# Patient Record
Sex: FEMALE | Race: BLACK OR AFRICAN AMERICAN | NOT HISPANIC OR LATINO | URBAN - METROPOLITAN AREA
[De-identification: names, ages, dates, MRNs, and addresses within clinical notes are randomized per-mention and may not be internally consistent; named-entity substitution may affect disease eponyms.]

---

## 2023-04-02 PROBLEM — R87.612 PAP SMEAR ABNORMALITY OF CERVIX WITH LGSIL: Status: ACTIVE | Noted: 2023-04-02

## 2023-04-02 PROBLEM — H93.19 SUBJECTIVE TINNITUS: Status: ACTIVE | Noted: 2023-04-02

## 2023-04-02 PROBLEM — N93.0 POSTCOITAL BLEEDING: Status: ACTIVE | Noted: 2023-04-02

## 2023-04-02 PROBLEM — H90.3 ASYMMETRICAL SENSORINEURAL HEARING LOSS: Status: ACTIVE | Noted: 2023-04-02

## 2023-04-02 PROBLEM — R10.2 PELVIC PAIN: Status: ACTIVE | Noted: 2023-04-02

## 2023-04-02 PROBLEM — Z97.5 IUD (INTRAUTERINE DEVICE) IN PLACE: Status: ACTIVE | Noted: 2023-04-02
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
## 2023-04-02 PROBLEM — E78.5 HYPERLIPIDEMIA: Status: ACTIVE | Noted: 2023-04-02

## 2023-04-02 PROBLEM — N76.0 BACTERIAL VAGINOSIS: Status: ACTIVE | Noted: 2023-04-02

## 2023-04-02 PROBLEM — B37.9 YEAST INFECTION: Status: ACTIVE | Noted: 2023-04-02

## 2023-04-02 PROBLEM — J01.90 ACUTE SINUSITIS: Status: ACTIVE | Noted: 2023-04-02

## 2023-04-02 PROBLEM — B96.89 BACTERIAL VAGINOSIS: Status: ACTIVE | Noted: 2023-04-02

## 2023-04-02 PROBLEM — R87.619 ABNORMAL PAP SMEAR OF CERVIX: Status: ACTIVE | Noted: 2023-04-02

## 2023-04-02 PROBLEM — S16.1XXA CERVICAL STRAIN: Status: ACTIVE | Noted: 2023-04-02

## 2023-04-02 PROBLEM — H90.41 SENSORINEURAL HEARING LOSS (SNHL) OF RIGHT EAR WITH UNRESTRICTED HEARING OF LEFT EAR: Status: ACTIVE | Noted: 2023-04-02

## 2023-04-02 PROBLEM — F17.200 TOBACCO DEPENDENCE: Status: ACTIVE | Noted: 2023-04-02

## 2023-04-02 PROBLEM — N93.9 ABNORMAL UTERINE BLEEDING (AUB): Status: ACTIVE | Noted: 2023-04-02

## 2023-04-02 PROBLEM — S86.899A SHIN SPLINTS: Status: ACTIVE | Noted: 2023-04-02

## 2023-04-02 PROBLEM — L74.510 HYPERHIDROSIS OF AXILLA: Status: ACTIVE | Noted: 2023-04-02

## 2023-04-02 PROBLEM — R11.0 NAUSEA IN ADULT: Status: ACTIVE | Noted: 2023-04-02

## 2023-04-02 RX ORDER — LEVONORGESTREL 52 MG/1
INTRAUTERINE DEVICE INTRAUTERINE
COMMUNITY

## 2023-04-03 ENCOUNTER — OFFICE VISIT (OUTPATIENT)
Dept: PRIMARY CARE | Facility: CLINIC | Age: 33
End: 2023-04-03
Payer: COMMERCIAL

## 2023-04-03 VITALS
RESPIRATION RATE: 18 BRPM | DIASTOLIC BLOOD PRESSURE: 84 MMHG | SYSTOLIC BLOOD PRESSURE: 110 MMHG | BODY MASS INDEX: 29.88 KG/M2 | HEART RATE: 70 BPM | WEIGHT: 153 LBS

## 2023-04-03 DIAGNOSIS — S33.5XXS LUMBAR SPRAIN, SEQUELA: Primary | ICD-10-CM

## 2023-04-03 DIAGNOSIS — M54.31 SCIATICA OF RIGHT SIDE: ICD-10-CM

## 2023-04-03 PROCEDURE — 99214 OFFICE O/P EST MOD 30 MIN: CPT | Performed by: INTERNAL MEDICINE

## 2023-04-03 RX ORDER — PREDNISONE 10 MG/1
TABLET ORAL
Qty: 22 TABLET | Refills: 0 | Status: SHIPPED | OUTPATIENT
Start: 2023-04-03 | End: 2023-04-10

## 2023-04-03 ASSESSMENT — ENCOUNTER SYMPTOMS
GASTROINTESTINAL NEGATIVE: 1
BACK PAIN: 1
NEUROLOGICAL NEGATIVE: 1
CONSTITUTIONAL NEGATIVE: 1
EYES NEGATIVE: 1
CARDIOVASCULAR NEGATIVE: 1
RESPIRATORY NEGATIVE: 1
PSYCHIATRIC NEGATIVE: 1

## 2023-04-03 NOTE — PROGRESS NOTES
Subjective   Patient ID: Keyona Navas is a 32 y.o. female who presents for Back Pain.    HPI     Patient presents with 2 months of low back pain radiates into her right leg aggravated by work with other lifting or bending forward which she does repetitively at her job.  Went to urgent care was treated with Medrol Dosepak and muscle relaxer with some improvement along with being off of work for a week some improvement.  She is not pregnant or breast-feeding.  No bowel or bladder dysfunction, no falls symptoms conditions moderate severity stable controlled over the past 2 months.    Review of Systems   Constitutional: Negative.    HENT: Negative.     Eyes: Negative.    Respiratory: Negative.     Cardiovascular: Negative.    Gastrointestinal: Negative.    Genitourinary: Negative.    Musculoskeletal:  Positive for back pain.   Skin: Negative.    Neurological: Negative.    Psychiatric/Behavioral: Negative.         Objective   /84   Pulse 70   Resp 18   Wt 69.4 kg (153 lb)   BMI 29.88 kg/m²     Physical Exam  Constitutional:       Appearance: Normal appearance.   HENT:      Head: Normocephalic.      Nose: Nose normal.   Pulmonary:      Effort: Pulmonary effort is normal.   Musculoskeletal:      Cervical back: Normal range of motion.      Right lower leg: No edema.      Left lower leg: No edema.      Comments: Lumbar spasm, stiff gait   Skin:     General: Skin is warm and dry.   Neurological:      General: No focal deficit present.      Mental Status: She is alert and oriented to person, place, and time. Mental status is at baseline.   Psychiatric:         Mood and Affect: Mood normal.         Behavior: Behavior normal.         Thought Content: Thought content normal.         Judgment: Judgment normal.         Assessment/Plan   Diagnoses and all orders for this visit:  Lumbar sprain, sequela  -     Referral to Physical Therapy; Future  -     MR lumbar spine wo IV contrast; Future  -     predniSONE  (Deltasone) 10 mg tablet; Take 4 tablets (40 mg) by mouth once daily for 4 days, THEN 3 tablets (30 mg) once daily for 1 day, THEN 2 tablets (20 mg) once daily for 1 day, THEN 1 tablet (10 mg) once daily for 1 day.  Sciatica of right side  -     Referral to Physical Therapy; Future  -     MR lumbar spine wo IV contrast; Future  -     predniSONE (Deltasone) 10 mg tablet; Take 4 tablets (40 mg) by mouth once daily for 4 days, THEN 3 tablets (30 mg) once daily for 1 day, THEN 2 tablets (20 mg) once daily for 1 day, THEN 1 tablet (10 mg) once daily for 1 day.       Lumbar sprain with right lower extremity sciatica.  We will put her on 40 mg prednisone taper down over a week we will start some physical therapy and will order MRI lumbar spine.  We will put her out of work from 4/3/2023 through 4/13/2023 to allow her back to rest and heal up.  If not improving she will follow-up in the office    This note dictated with Dragon software, not proofread for errors or punctuation.

## 2023-06-05 ENCOUNTER — OFFICE VISIT (OUTPATIENT)
Dept: PRIMARY CARE | Facility: CLINIC | Age: 33
End: 2023-06-05
Payer: COMMERCIAL

## 2023-06-05 VITALS
HEART RATE: 66 BPM | BODY MASS INDEX: 31.02 KG/M2 | HEIGHT: 60 IN | WEIGHT: 158 LBS | TEMPERATURE: 97.5 F | DIASTOLIC BLOOD PRESSURE: 84 MMHG | SYSTOLIC BLOOD PRESSURE: 121 MMHG

## 2023-06-05 DIAGNOSIS — S33.5XXS LUMBAR SPRAIN, SEQUELA: Primary | ICD-10-CM

## 2023-06-05 DIAGNOSIS — F17.200 TOBACCO DEPENDENCE: ICD-10-CM

## 2023-06-05 PROCEDURE — 1036F TOBACCO NON-USER: CPT | Performed by: FAMILY MEDICINE

## 2023-06-05 PROCEDURE — 3008F BODY MASS INDEX DOCD: CPT | Performed by: FAMILY MEDICINE

## 2023-06-05 PROCEDURE — 99213 OFFICE O/P EST LOW 20 MIN: CPT | Performed by: FAMILY MEDICINE

## 2023-06-05 RX ORDER — PREDNISONE 10 MG/1
10 TABLET ORAL DAILY
COMMUNITY
End: 2023-10-05

## 2023-06-05 ASSESSMENT — ENCOUNTER SYMPTOMS
TINGLING: 0
LOSS OF SENSATION: 0

## 2023-06-05 NOTE — PROGRESS NOTES
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    She showed me pictures of beans that she has to bend down and collect magazines wearing from 5 pounds to 7 pounds that is constantly bending and carrying and putting on a shelf she says this job was given to her since November and since November she is having back pain lower back and also now she notices pain going down her legs below the knee joint  On exam her neurological exam was negative and also I see that she has lot of scratch marks on the back    She is 32-year-old I feel this is due to repetitive movements bending forward that she has muscular pain but I will refer her to rehabilitation specialist for work restriction and work strengthening exercise program    I advised patient to use a moisturizer to help with her dry skin on the back    She also is a chronic smoker referred her to smoking cessation class    I advised patient to come back for her annual physical

## 2023-06-05 NOTE — LETTER
June 5, 2023     Patient: Keyona Navas   YOB: 1990   Date of Visit: 6/5/2023       To Whom It May Concern:    Keyona Navas was seen in my clinic on 6/5/2023 at 8:15 am. Please excuse Keyona for her absence from work on this day to make the appointment. She can return back to work on  06/06/2023.    If you have any questions or concerns, please don't hesitate to call.         Sincerely,         Caitlin Andrews MD        CC: No Recipients

## 2023-07-12 ENCOUNTER — OFFICE VISIT (OUTPATIENT)
Dept: PRIMARY CARE | Facility: CLINIC | Age: 33
End: 2023-07-12
Payer: COMMERCIAL

## 2023-07-12 VITALS
WEIGHT: 152 LBS | HEART RATE: 86 BPM | DIASTOLIC BLOOD PRESSURE: 81 MMHG | BODY MASS INDEX: 29.84 KG/M2 | TEMPERATURE: 97.6 F | SYSTOLIC BLOOD PRESSURE: 117 MMHG | HEIGHT: 60 IN

## 2023-07-12 DIAGNOSIS — E55.9 VITAMIN D DEFICIENCY: ICD-10-CM

## 2023-07-12 DIAGNOSIS — Z20.2 STD EXPOSURE: ICD-10-CM

## 2023-07-12 LAB
CALCIDIOL (25 OH VITAMIN D3) (NG/ML) IN SER/PLAS: 38 NG/ML
HEPATITIS B VIRUS SURFACE AG PRESENCE IN SERUM: NONREACTIVE
HEPATITIS C VIRUS AB PRESENCE IN SERUM: NONREACTIVE
HIV 1/ 2 AG/AB SCREEN: NONREACTIVE

## 2023-07-12 PROCEDURE — 3008F BODY MASS INDEX DOCD: CPT | Performed by: FAMILY MEDICINE

## 2023-07-12 PROCEDURE — 86803 HEPATITIS C AB TEST: CPT

## 2023-07-12 PROCEDURE — 1036F TOBACCO NON-USER: CPT | Performed by: FAMILY MEDICINE

## 2023-07-12 PROCEDURE — 86592 SYPHILIS TEST NON-TREP QUAL: CPT

## 2023-07-12 PROCEDURE — 87340 HEPATITIS B SURFACE AG IA: CPT

## 2023-07-12 PROCEDURE — 99395 PREV VISIT EST AGE 18-39: CPT | Performed by: FAMILY MEDICINE

## 2023-07-12 PROCEDURE — 87389 HIV-1 AG W/HIV-1&-2 AB AG IA: CPT

## 2023-07-12 PROCEDURE — 82306 VITAMIN D 25 HYDROXY: CPT

## 2023-07-12 NOTE — PROGRESS NOTES
Subjective   Patient ID: Keyona Navas is a 33 y.o. female who presents for Annual Exam.    HPI     Review of Systems   All other systems reviewed and are negative.      Objective   /81   Pulse 86   Temp 36.4 °C (97.6 °F)   Ht 1.524 m (5')   Wt 68.9 kg (152 lb)   BMI 29.69 kg/m²     Physical Exam  HENT:      Head: Normocephalic.   Cardiovascular:      Rate and Rhythm: Normal rate.   Pulmonary:      Effort: Pulmonary effort is normal.   Abdominal:      General: Abdomen is flat.   Musculoskeletal:         General: Normal range of motion.      Cervical back: Normal range of motion.   Skin:     General: Skin is warm.   Neurological:      General: No focal deficit present.      Mental Status: She is alert.   Psychiatric:         Mood and Affect: Mood normal.         Assessment/Plan     This is a 33-year-old female patient here for her annual well visit    She is still smoking promised to attend the smoking cessation class    Also she has a history of low-grade squamous cell changes on her cervix advised her to see the gynecologist    We talked about healthy sleeping eating and exercise habits and I hope that will help her to heal herself human papilloma virus      Follow-up in 6  months

## 2023-07-13 LAB — RPR MONITORING: NONREACTIVE

## 2023-09-23 PROBLEM — Z86.79 HISTORY OF VARICOSE VEINS: Status: ACTIVE | Noted: 2023-09-23

## 2023-09-23 PROBLEM — N87.0 CIN I (CERVICAL INTRAEPITHELIAL NEOPLASIA I): Status: ACTIVE | Noted: 2023-09-23

## 2023-09-23 RX ORDER — ALBUTEROL SULFATE 90 UG/1
2 AEROSOL, METERED RESPIRATORY (INHALATION) EVERY 4 HOURS PRN
COMMUNITY

## 2023-09-23 RX ORDER — ASPIRIN 325 MG
50000 TABLET, DELAYED RELEASE (ENTERIC COATED) ORAL
COMMUNITY
Start: 2023-04-04

## 2023-09-23 RX ORDER — LORATADINE 10 MG/1
10 TABLET ORAL DAILY PRN
COMMUNITY
Start: 2017-05-12 | End: 2023-10-05 | Stop reason: SDUPTHER

## 2023-09-23 RX ORDER — BUPROPION HYDROCHLORIDE 150 MG/1
150 TABLET, EXTENDED RELEASE ORAL DAILY
COMMUNITY
Start: 2022-12-16 | End: 2023-10-05 | Stop reason: SDUPTHER

## 2023-09-23 RX ORDER — CETIRIZINE HYDROCHLORIDE 10 MG/1
10 TABLET ORAL
COMMUNITY
Start: 2022-08-26

## 2023-10-05 ENCOUNTER — OFFICE VISIT (OUTPATIENT)
Dept: PRIMARY CARE | Facility: CLINIC | Age: 33
End: 2023-10-05
Payer: COMMERCIAL

## 2023-10-05 ENCOUNTER — HOSPITAL ENCOUNTER (OUTPATIENT)
Dept: RADIOLOGY | Facility: HOSPITAL | Age: 33
Discharge: HOME | End: 2023-10-05
Payer: COMMERCIAL

## 2023-10-05 VITALS
HEART RATE: 77 BPM | OXYGEN SATURATION: 99 % | HEIGHT: 60 IN | BODY MASS INDEX: 29.96 KG/M2 | WEIGHT: 152.6 LBS | DIASTOLIC BLOOD PRESSURE: 83 MMHG | SYSTOLIC BLOOD PRESSURE: 128 MMHG

## 2023-10-05 DIAGNOSIS — M54.41 CHRONIC RIGHT-SIDED LOW BACK PAIN WITH RIGHT-SIDED SCIATICA: ICD-10-CM

## 2023-10-05 DIAGNOSIS — G89.29 CHRONIC RIGHT-SIDED LOW BACK PAIN WITH RIGHT-SIDED SCIATICA: ICD-10-CM

## 2023-10-05 DIAGNOSIS — Z71.6 ENCOUNTER FOR SMOKING CESSATION COUNSELING: Primary | ICD-10-CM

## 2023-10-05 PROCEDURE — 72110 X-RAY EXAM L-2 SPINE 4/>VWS: CPT | Performed by: RADIOLOGY

## 2023-10-05 PROCEDURE — 72110 X-RAY EXAM L-2 SPINE 4/>VWS: CPT | Mod: FY

## 2023-10-05 PROCEDURE — 1036F TOBACCO NON-USER: CPT | Performed by: STUDENT IN AN ORGANIZED HEALTH CARE EDUCATION/TRAINING PROGRAM

## 2023-10-05 PROCEDURE — 3008F BODY MASS INDEX DOCD: CPT | Performed by: STUDENT IN AN ORGANIZED HEALTH CARE EDUCATION/TRAINING PROGRAM

## 2023-10-05 PROCEDURE — 99214 OFFICE O/P EST MOD 30 MIN: CPT | Performed by: STUDENT IN AN ORGANIZED HEALTH CARE EDUCATION/TRAINING PROGRAM

## 2023-10-05 RX ORDER — BUPROPION HYDROCHLORIDE 150 MG/1
TABLET, EXTENDED RELEASE ORAL
Qty: 180 TABLET | Refills: 0 | Status: SHIPPED | OUTPATIENT
Start: 2023-10-05

## 2023-10-05 NOTE — PROGRESS NOTES
Subjective   Patient ID: Keyona Navas is a 33 y.o. female who presents for New Patient Visit (Discuss increasing meds and discuss back meds. Pt declined flu shot. ).        HPI    Pt of Dr. Caro , has est with Dr. Andrews since he left   Would like to increase the dose of Wellbutrin to help quit smoking   She is currently vaping , prior to this she was smoking 1/2 PPD since the age of 15   Last use of Wellbutrin in June , none since then        10/23  Physical therapy appt   Back pain since this April , works in a post office , lots of bending and lifting   MRI was ordered , denied by her insurance        Visit Vitals  /83   Pulse 77   Ht 1.524 m (5')   Wt 69.2 kg (152 lb 9.6 oz)   LMP 09/21/2023   SpO2 99%   BMI 29.80 kg/m²   Smoking Status Never   BSA 1.71 m²      Patient's last menstrual period was 09/21/2023.     Review of Systems    Constitutional : No feeling poorly / fevers/ chills / night sweats/ fatigue   Cardiovascular : No CP /Palpitations/ lower extremity edema / syncope   Respiratory : No Cough /LOYA/Dyspnea at rest     CNS: No confusion / HA/ tingling/ numbness/ weakness of extremities  Psychiatric: No anxiety/ depression/ SI/HI    All other systems have been reviewed and are negative for complaint       Physical Exam    Constitutional : Vitals reviewed. Alert and in no distress  Cardiovascular : RRR, Normal S1, S2, No pericardial rub/ gallop, no peripheral edema   Pulmonary: No respiratory distress, CTAB   MSK : Normal gait and station , strength and tone   SLRT negative     Neurologic : CNs 2-12 grossly intact , no obvious FNDs  Psych : A,Ox3, normal mood and affect      Assessment/Plan   Diagnoses and all orders for this visit:  Encounter for smoking cessation counseling  -     buPROPion SR (Wellbutrin SR) 150 mg 12 hr tablet; Start 1 week before quit date , take 1 tablet once a day for three days and then twice daily  Chronic right-sided low back pain with right-sided sciatica  -      XR lumbar spine complete 4+ views; Future  Other orders  -     Follow Up In Primary Care      Letter written for accomodation at work   Imaging for Lower back pain and PT           Conditions addressed and mgmt as noted above.  Pertinent labs, images/ imaging reports , chart review was done .   Age appropriate labs / labs for mgmt of chronic medical conditions ordered, further mgmt pending the results.

## 2023-10-05 NOTE — LETTER
To whom it may concern .   This is written on behalf of Keyona higgins ( 1990) . Please allow for accommodation at work , so that she can sit in a chair and perform her tasks . This will minimize strain on her back from constant bending .     Sherly Elizabeth MD

## 2023-10-05 NOTE — LETTER
October 5, 2023     Patient: Keyona Navas   YOB: 1990   Date of Visit: 10/5/2023       To Whom It May Concern:    Keyona Navas was seen in my clinic on 10/5/2023 at 10:30 am. Please excuse Keyona for her absence from work on this day to make the appointment.    If you have any questions or concerns, please don't hesitate to call.         Sincerely,         Sherly Elizabeth MD        CC: No Recipients

## 2023-10-05 NOTE — PATIENT INSTRUCTIONS
We are on a new system that is paperless.     Lab location for blood work :  1. Located across the office , just past the elevators .   [address]. [address].  If you are coming on a Saturday, go to [address] for the blood draw    Radiology : [address] for Xrays  You can also schedule non urgent imaging by calling .     For scheduling appts, call . You might be receiving call from central scheduling as well.    For scheduling colonoscopy, call .     For scheduling physical therapy, call [phone] ( [phone]).    For any other scheduling questions or locations, please ask the medical assistant or the .

## 2023-10-10 ENCOUNTER — OFFICE VISIT (OUTPATIENT)
Dept: OTOLARYNGOLOGY | Facility: CLINIC | Age: 33
End: 2023-10-10
Payer: COMMERCIAL

## 2023-10-10 VITALS — TEMPERATURE: 97.5 F | HEIGHT: 65 IN | BODY MASS INDEX: 25.39 KG/M2 | WEIGHT: 152.4 LBS

## 2023-10-10 DIAGNOSIS — H90.41 SENSORINEURAL HEARING LOSS (SNHL) OF RIGHT EAR WITH UNRESTRICTED HEARING OF LEFT EAR: ICD-10-CM

## 2023-10-10 DIAGNOSIS — H90.3 ASYMMETRICAL SENSORINEURAL HEARING LOSS: Primary | ICD-10-CM

## 2023-10-10 DIAGNOSIS — H90.3 SENSORINEURAL HEARING LOSS, ASYMMETRICAL: ICD-10-CM

## 2023-10-10 PROCEDURE — 4004F PT TOBACCO SCREEN RCVD TLK: CPT | Performed by: STUDENT IN AN ORGANIZED HEALTH CARE EDUCATION/TRAINING PROGRAM

## 2023-10-10 PROCEDURE — 3008F BODY MASS INDEX DOCD: CPT | Performed by: STUDENT IN AN ORGANIZED HEALTH CARE EDUCATION/TRAINING PROGRAM

## 2023-10-10 PROCEDURE — 99204 OFFICE O/P NEW MOD 45 MIN: CPT | Performed by: STUDENT IN AN ORGANIZED HEALTH CARE EDUCATION/TRAINING PROGRAM

## 2023-10-10 RX ORDER — AMOXICILLIN AND CLAVULANATE POTASSIUM 875; 125 MG/1; MG/1
1 TABLET, FILM COATED ORAL EVERY 12 HOURS
COMMUNITY
End: 2023-12-12 | Stop reason: ALTCHOICE

## 2023-10-10 RX ORDER — METRONIDAZOLE 500 MG/1
1 TABLET ORAL 2 TIMES DAILY
COMMUNITY
End: 2023-12-12 | Stop reason: WASHOUT

## 2023-10-10 RX ORDER — IBUPROFEN 800 MG/1
TABLET ORAL
COMMUNITY
Start: 2023-07-24

## 2023-10-10 ASSESSMENT — ENCOUNTER SYMPTOMS
SHORTNESS OF BREATH: 0
NEUROLOGICAL NEGATIVE: 1
CARDIOVASCULAR NEGATIVE: 1
CONSTITUTIONAL NEGATIVE: 1
GASTROINTESTINAL NEGATIVE: 1
CHILLS: 0
FEVER: 0
RESPIRATORY NEGATIVE: 1

## 2023-10-10 ASSESSMENT — PATIENT HEALTH QUESTIONNAIRE - PHQ9
1. LITTLE INTEREST OR PLEASURE IN DOING THINGS: NOT AT ALL
SUM OF ALL RESPONSES TO PHQ9 QUESTIONS 1 & 2: 0
2. FEELING DOWN, DEPRESSED OR HOPELESS: NOT AT ALL

## 2023-10-11 NOTE — PROGRESS NOTES
CHIEF COMPLAINT:   Chief Complaint   Patient presents with    Hearing Loss     Right ear hearing loss.       HISTORY OF PRESENT ILLNESS: Keyona Navas is a 33 y.o. female who presents today with symptoms of right sided hearing loss.  She reports that she has had right sided hearing loss since birth, but it has progressed in recent years, particularly in word recognition score.  She denies a history of ear surgery or ear tubes.      PAST MEDICAL HISTORY:   Past Medical History:   Diagnosis Date    Acute vaginitis 01/03/2023    Bacterial vaginosis    Candidiasis, unspecified 08/11/2020    Yeast infection    Encounter for gynecological examination (general) (routine) without abnormal findings 09/30/2019    Well female exam with routine gynecological exam    Encounter for insertion of intrauterine contraceptive device     Encounter for IUD insertion    Nausea 08/11/2020    Nausea in adult    Nontraumatic compartment syndrome of left lower extremity 01/05/2016    Compartment syndrome of left lower extremity due to exertion    Nontraumatic compartment syndrome of right lower extremity 01/05/2016    Compartment syndrome of right lower extremity due to exertion    Nontraumatic compartment syndrome of unspecified lower extremity 09/29/2015    Exertional compartment syndrome of lower extremity    Other chest pain 04/28/2017    Atypical chest pain    Other conditions influencing health status     History of dyspareunia    Other conditions influencing health status 12/22/2015    Exertional compartment syndrome    Other problems related to lifestyle     Other problems related to lifestyle    Other specified disorders of bone, lower leg 12/22/2015    Bilateral tibial pain    Other specified health status     No pertinent past medical history    Pain in right knee 04/23/2015    Bilateral knee pain    Pain in right leg 12/22/2015    Leg pain, bilateral    Personal history of other diseases of the digestive system 04/28/2017     History of gastroesophageal reflux (GERD)    Personal history of other diseases of the female genital tract 10/01/2014    History of ovarian cyst    Personal history of other specified conditions 04/08/2015    History of urinary frequency       PAST SURGICAL HISTORY: History reviewed. No pertinent surgical history.    MEDICATIONS:   Current Outpatient Medications:     albuterol 90 mcg/actuation inhaler, Inhale 2 puffs every 4 hours if needed for shortness of breath., Disp: , Rfl:     buPROPion SR (Wellbutrin SR) 150 mg 12 hr tablet, Start 1 week before quit date , take 1 tablet once a day for three days and then twice daily, Disp: 180 tablet, Rfl: 0    cholecalciferol (Vitamin D-3) 1,250 mcg (50,000 unit) capsule, Take 1 capsule (50,000 Units) by mouth every 14 (fourteen) days., Disp: , Rfl:     ibuprofen 800 mg tablet, TAKE 1 TABLET BY MOUTH EVERY 8 HOURS AS NEEDED FOR 10 DAYS, Disp: , Rfl:     levonorgestrel (Mirena) 21 mcg/24 hours (8 yrs) 52 mg IUD, Mirena (52 MG) 20 MCG/24HR IUD  Refills: 0     Active, Disp: , Rfl:     amoxicillin-pot clavulanate (Augmentin) 875-125 mg tablet, Take 1 tablet (875 mg) by mouth every 12 hours., Disp: , Rfl:     cetirizine (ZyrTEC) 10 mg tablet, Take 1 tablet (10 mg) by mouth once daily., Disp: , Rfl:     metroNIDAZOLE (Flagyl) 500 mg tablet, Take 1 tablet (500 mg) by mouth 2 times a day., Disp: , Rfl:     ALLERGIES: No Known Allergies    SOCIAL HISTORY:   reports that she has been smoking cigarettes. She started smoking about 12 years ago. She has been smoking an average of .5 packs per day. She uses smokeless tobacco. She reports current drug use. Drug: Marijuana. She reports that she does not drink alcohol.    FAMILY HISTORY: family history includes Diabetes in an other family member; Hypertension in an other family member; No Known Problems in her mother.    REVIEW OF SYSTEMS  Review of Systems   Constitutional: Negative.  Negative for chills and fever.   HENT:  Positive for  "hearing loss. Negative for ear discharge and ear pain.    Respiratory: Negative.  Negative for shortness of breath.    Cardiovascular: Negative.  Negative for chest pain.   Gastrointestinal: Negative.    Neurological: Negative.        PHYSICAL EXAM:    VITALS:   Vitals:    10/10/23 1530   Temp: 36.4 °C (97.5 °F)   Weight: 69.1 kg (152 lb 6.4 oz)   Height: 1.651 m (5' 5\")        GENERAL: healthy, alert, well developed, well nourished, no distress, cooperative, appears chronologic age    Communicates with normal voice and without hearing aids.    HEAD: atraumatic, normocephalic, no lesions    EYES: normal, PERRLA and EOM's intact    EARS:   Right ear demonstrates a patent external auditory canal with a normal tympanic membrane.    Left ear: demonstrates a patent external auditory canal with a normal tympanic membrane.       NOSE: nose shows no deformity, asymmetry, or inflammation, nasal mucosa normal, septum midline with no perforation or bleeding, turbinates normal, no polyps, no sinus tenderness    ORAL CAVITY/OROPHARYNX: negative findings: lips normal without lesions, buccal mucosa normal, gums healthy, teeth intact, non-carious, palate normal, tongue midline and normal, soft palate, uvula, and tonsils normal    NECK AND SALIVARY GLANDS: Neck is supple without masses or lymphadenopathy. Palpation of the parotid and submandibular gland reveals no masses or tenderness to palpation.    CRANIAL NERVES: intact,   Facial nerve exam  is House - Brackmann 1- Normal Function on the right and 1- Normal Function on the left.    CARDIOVASCULAR: no evidence of peripheral edema    PULMONARY: normal lung excursion, no evidence of retractions    DATA REVIEWED:  Audiogram  I reviewed the audiogram from 6/13/2023, which demonstrated normal left hearing and moderate right rising to normal hearing with word recognition score of 96% on the left and 16% on the right    I reviewed the cochlear implant evaluation and BAHA " evaluation:  Aided Behavioral testing (Viviana bob):   Pure tones: Threshold rise from 65 to 25 dB HL from 250- 6000 Hz with 50 dB HL narrowband masking noise in her left ear.  CNC: 32% tested at 50 dB HL with 40 dB HL speech masking noise in her left ear.  AzBio: 42% tested at 50 dB HL with 40 dB HL speech masking noise in her left ear.    BAHA testing information:   Patient was tested with a [employer] BAHA 5 Power device.     BAHA Behavioral testing:   Pure tones: Thresholds rising from 75 to 35 dB HL with 50 dB HL narrowband masking noise in her left ear.   CNC: 84% tested at 50 dB HL with 40 dB HL speech masking noise in her left ear.  AzBio: 64% tested at 50 dB HL with 40 dB HL speech masking noise in her left ear.    Unaided testing:   Pure tones: See previous audiogram for results.  CNC: 8% tested at 50 dB HL with 40 dB HL speech masking noise in her left ear.  AzBio: 32% tested at 50 dB HL with 40 dB HL speech masking noise in her left ear.     IMPRESSION:   1) Right sensorineural hearing loss    PLAN:  I discussed the patient's sensorineural hearing loss on the right side.  I discussed her options for hearing rehabilitation including, but not limited to, CROS hearing aids, bone-conduction devices including Baha and osia, and cochlear implantation.  In particular, I discussed cochlear implantation at length including that adjusting to the cochlear implantation takes significant amount of time up to 1 year after implantation.  I also discussed the risks of cochlear implantation including, but not limited to, bleeding, pain, infection, poor performance, loss of residual hearing, dizziness, facial weakness, change in taste, cerebrospinal fluid leak, implant infection, implant extrusion, implant malfunction, meningitis, tympanic membrane perforation, need for further procedures.  She is potentially interested in right-sided cochlear implantation, but would like to think more on this.  We will  obtain an MRI IAC with and without contrast.  She will follow-up in 6 to 8 weeks to rediscuss her options.  I would recommend a cochlear brand 632 with 622 backup if she opts for cochlear device.  If she drops for an [employer]ion[employer] device, I would recommend slim J implant with a slim J backup.  If she opts for a MED-EL device, I would recommend a flex 28 with a flex 28 backup.

## 2023-10-23 ENCOUNTER — DOCUMENTATION (OUTPATIENT)
Dept: PHYSICAL MEDICINE AND REHAB | Facility: CLINIC | Age: 33
End: 2023-10-23

## 2023-10-23 NOTE — PROGRESS NOTES
The patient did not show up for her scheduled appointment today. She is free to call back and reschedule at her earliest convenience, at which point she will be reminded of our no show policy.    Evita Hernandez MD  PM&R

## 2023-10-26 ENCOUNTER — ANCILLARY PROCEDURE (OUTPATIENT)
Dept: RADIOLOGY | Facility: CLINIC | Age: 33
End: 2023-10-26
Payer: COMMERCIAL

## 2023-10-26 DIAGNOSIS — H90.3 SENSORINEURAL HEARING LOSS, ASYMMETRICAL: ICD-10-CM

## 2023-10-26 PROCEDURE — A9575 INJ GADOTERATE MEGLUMI 0.1ML: HCPCS | Performed by: STUDENT IN AN ORGANIZED HEALTH CARE EDUCATION/TRAINING PROGRAM

## 2023-10-26 PROCEDURE — 70553 MRI BRAIN STEM W/O & W/DYE: CPT

## 2023-10-26 PROCEDURE — 2550000001 HC RX 255 CONTRASTS: Performed by: STUDENT IN AN ORGANIZED HEALTH CARE EDUCATION/TRAINING PROGRAM

## 2023-10-26 PROCEDURE — 70553 MRI BRAIN STEM W/O & W/DYE: CPT | Performed by: RADIOLOGY

## 2023-10-26 RX ORDER — GADOTERATE MEGLUMINE 376.9 MG/ML
14 INJECTION INTRAVENOUS
Status: COMPLETED | OUTPATIENT
Start: 2023-10-26 | End: 2023-10-26

## 2023-10-26 RX ADMIN — GADOTERATE MEGLUMINE 14 ML: 376.9 INJECTION INTRAVENOUS at 10:09

## 2023-10-27 ENCOUNTER — TELEPHONE (OUTPATIENT)
Dept: OPERATING ROOM | Facility: HOSPITAL | Age: 33
End: 2023-10-27
Payer: COMMERCIAL

## 2023-10-27 ENCOUNTER — TELEPHONE (OUTPATIENT)
Dept: OTOLARYNGOLOGY | Facility: CLINIC | Age: 33
End: 2023-10-27
Payer: COMMERCIAL

## 2023-10-27 NOTE — TELEPHONE ENCOUNTER
"Patient notified of MRI results reviewed by Dr. Grayson. Message was left on her voicemail.    \"I reviewed both the images and report.  Can you call her and let her know the MRI was normal.\"  Dr. Grayson      "

## 2023-10-27 NOTE — TELEPHONE ENCOUNTER
Patient called on preferred number about MRI results. Per Dr. Grayson the MRI results were normal. Left voicemail with results.

## 2023-12-12 ENCOUNTER — OFFICE VISIT (OUTPATIENT)
Dept: OTOLARYNGOLOGY | Facility: CLINIC | Age: 33
End: 2023-12-12
Payer: COMMERCIAL

## 2023-12-12 VITALS — WEIGHT: 147.34 LBS | TEMPERATURE: 97.8 F | BODY MASS INDEX: 24.55 KG/M2 | HEIGHT: 65 IN

## 2023-12-12 DIAGNOSIS — H90.3 SENSORINEURAL HEARING LOSS (SNHL) OF BOTH EARS: ICD-10-CM

## 2023-12-12 DIAGNOSIS — Z01.818 PREOPERATIVE CLEARANCE: ICD-10-CM

## 2023-12-12 PROCEDURE — 99213 OFFICE O/P EST LOW 20 MIN: CPT | Performed by: STUDENT IN AN ORGANIZED HEALTH CARE EDUCATION/TRAINING PROGRAM

## 2023-12-12 PROCEDURE — 4004F PT TOBACCO SCREEN RCVD TLK: CPT | Performed by: STUDENT IN AN ORGANIZED HEALTH CARE EDUCATION/TRAINING PROGRAM

## 2023-12-12 PROCEDURE — 3008F BODY MASS INDEX DOCD: CPT | Performed by: STUDENT IN AN ORGANIZED HEALTH CARE EDUCATION/TRAINING PROGRAM

## 2023-12-12 RX ORDER — OMEPRAZOLE 20 MG/1
20 TABLET, DELAYED RELEASE ORAL
Qty: 15 TABLET | Refills: 0 | Status: SHIPPED | OUTPATIENT
Start: 2023-12-12 | End: 2023-12-27

## 2023-12-12 RX ORDER — PREDNISONE 10 MG/1
TABLET ORAL
Qty: 60 TABLET | Refills: 0 | Status: SHIPPED | OUTPATIENT
Start: 2023-12-12 | End: 2023-12-27

## 2023-12-12 ASSESSMENT — ENCOUNTER SYMPTOMS
RESPIRATORY NEGATIVE: 1
NEUROLOGICAL NEGATIVE: 1
CARDIOVASCULAR NEGATIVE: 1
SHORTNESS OF BREATH: 0
FEVER: 0
CHILLS: 0
CONSTITUTIONAL NEGATIVE: 1
GASTROINTESTINAL NEGATIVE: 1

## 2023-12-12 NOTE — LETTER
December 12, 2023     Patient: Keyona Navas   YOB: 1990   Date of Visit: 12/12/2023       To Whom It May Concern:    Keyona Navas was seen in my clinic on 12/12/2023 at 8:00 am. Please excuse Keyona for her absence from school on this day to make the appointment.    If you have any questions or concerns, please don't hesitate to call.         Sincerely,         Ed Grayson MD        CC:   No Recipients

## 2023-12-12 NOTE — PROGRESS NOTES
CHIEF COMPLAINT:   Chief Complaint   Patient presents with    Follow-up     6 wks follow  up to discuss expectation from  surgery.       HISTORY OF PRESENT ILLNESS from 10/10/2023: Keoyna Navas is a 33 y.o. female who presents today with symptoms of right sided hearing loss.  She reports that she has had right sided hearing loss since birth, but it has progressed in recent years, particularly in word recognition score.  She denies a history of ear surgery or ear tubes.      Interval history: She has no change in her symptoms.  She continues to have significant right-sided hearing loss.  She has thought about her hearing options, and is most interested in cochlear implantation.    PAST MEDICAL HISTORY:   Past Medical History:   Diagnosis Date    Acute vaginitis 01/03/2023    Bacterial vaginosis    Candidiasis, unspecified 08/11/2020    Yeast infection    Encounter for gynecological examination (general) (routine) without abnormal findings 09/30/2019    Well female exam with routine gynecological exam    Encounter for insertion of intrauterine contraceptive device     Encounter for IUD insertion    Nausea 08/11/2020    Nausea in adult    Nontraumatic compartment syndrome of left lower extremity 01/05/2016    Compartment syndrome of left lower extremity due to exertion    Nontraumatic compartment syndrome of right lower extremity 01/05/2016    Compartment syndrome of right lower extremity due to exertion    Nontraumatic compartment syndrome of unspecified lower extremity 09/29/2015    Exertional compartment syndrome of lower extremity    Other chest pain 04/28/2017    Atypical chest pain    Other conditions influencing health status     History of dyspareunia    Other conditions influencing health status 12/22/2015    Exertional compartment syndrome    Other problems related to lifestyle     Other problems related to lifestyle    Other specified disorders of bone, lower leg 12/22/2015    Bilateral tibial pain     Other specified health status     No pertinent past medical history    Pain in right knee 04/23/2015    Bilateral knee pain    Pain in right leg 12/22/2015    Leg pain, bilateral    Personal history of other diseases of the digestive system 04/28/2017    History of gastroesophageal reflux (GERD)    Personal history of other diseases of the female genital tract 10/01/2014    History of ovarian cyst    Personal history of other specified conditions 04/08/2015    History of urinary frequency       PAST SURGICAL HISTORY: History reviewed. No pertinent surgical history.    MEDICATIONS:   Current Outpatient Medications:     albuterol 90 mcg/actuation inhaler, Inhale 2 puffs every 4 hours if needed for shortness of breath., Disp: , Rfl:     buPROPion SR (Wellbutrin SR) 150 mg 12 hr tablet, Start 1 week before quit date , take 1 tablet once a day for three days and then twice daily, Disp: 180 tablet, Rfl: 0    cetirizine (ZyrTEC) 10 mg tablet, Take 1 tablet (10 mg) by mouth once daily., Disp: , Rfl:     cholecalciferol (Vitamin D-3) 1,250 mcg (50,000 unit) capsule, Take 1 capsule (50,000 Units) by mouth every 14 (fourteen) days., Disp: , Rfl:     ibuprofen 800 mg tablet, TAKE 1 TABLET BY MOUTH EVERY 8 HOURS AS NEEDED FOR 10 DAYS, Disp: , Rfl:     levonorgestrel (Mirena) 21 mcg/24 hours (8 yrs) 52 mg IUD, Mirena (52 MG) 20 MCG/24HR IUD  Refills: 0     Active, Disp: , Rfl:     omeprazole OTC (PriLOSEC OTC) 20 mg EC tablet, Take 1 tablet (20 mg) by mouth once daily in the morning. Take before meals for 15 days. Do not crush, chew, or split. TAKE WHILE ON STEROIDS FOR SURGERY, Disp: 15 tablet, Rfl: 0    predniSONE (Deltasone) 10 mg tablet, Take 6 tablets (60 mg) by mouth once daily for 3 days, THEN No dosage for 1 day, THEN 6 tablets (60 mg) once daily for 2 days, THEN 5 tablets (50 mg) once daily for 2 days, THEN 4 tablets (40 mg) once daily for 2 days, THEN 3 tablets (30 mg) once daily for 2 days, THEN 2 tablets (20 mg)  "once daily for 2 days, THEN 1 tablet (10 mg) once daily for 2 days. START 3 days BEFORE surgery, NOTHING the day OF surgery, and RESTART the day AFTER surgery.., Disp: 60 tablet, Rfl: 0    ALLERGIES: No Known Allergies    SOCIAL HISTORY:   reports that she quit smoking 5 days ago. Her smoking use included cigarettes. She started smoking about 12 years ago. She smoked an average of .5 packs per day. She uses smokeless tobacco. She reports current drug use. Drug: Marijuana. She reports that she does not drink alcohol.    FAMILY HISTORY: family history includes Diabetes in an other family member; Hypertension in an other family member; No Known Problems in her mother.    REVIEW OF SYSTEMS  Review of Systems   Constitutional: Negative.  Negative for chills and fever.   HENT:  Positive for hearing loss. Negative for ear discharge and ear pain.    Respiratory: Negative.  Negative for shortness of breath.    Cardiovascular: Negative.  Negative for chest pain.   Gastrointestinal: Negative.    Neurological: Negative.        PHYSICAL EXAM:    VITALS:   Vitals:    12/12/23 0759   Temp: 36.6 °C (97.8 °F)   Weight: 66.8 kg (147 lb 5.4 oz)   Height: 1.651 m (5' 5\")        GENERAL: healthy, alert, well developed, well nourished, no distress, cooperative, appears chronologic age    Communicates with normal voice and without hearing aids.    HEAD: atraumatic, normocephalic, no lesions    EYES: normal, PERRLA and EOM's intact    EARS:   Right ear demonstrates a patent external auditory canal with a normal tympanic membrane.    Left ear: demonstrates a patent external auditory canal with a normal tympanic membrane.       NOSE: nose shows no deformity, asymmetry, or inflammation, nasal mucosa normal,     ORAL CAVITY/OROPHARYNX: negative findings: lips normal without lesions, buccal mucosa normal, gums healthy, teeth intact, non-carious, palate normal, tongue midline and normal, soft palate, uvula, and tonsils normal    NECK AND " SALIVARY GLANDS: Neck is supple without masses or lymphadenopathy. Palpation of the parotid and submandibular gland reveals no masses or tenderness to palpation.    CRANIAL NERVES: intact,   Facial nerve exam  is House - Brackmann 1- Normal Function on the right and 1- Normal Function on the left.    CARDIOVASCULAR: no evidence of peripheral edema    PULMONARY: normal lung excursion, no evidence of retractions    DATA REVIEWED:  Audiogram  I reviewed the audiogram from 6/13/2023, which demonstrated normal left hearing and moderate right rising to normal hearing with word recognition score of 96% on the left and 16% on the right    I reviewed the cochlear implant evaluation and BAHA evaluation:  Aided Behavioral testing (Viviana bob):   Pure tones: Threshold rise from 65 to 25 dB HL from 250- 6000 Hz with 50 dB HL narrowband masking noise in her left ear.  CNC: 32% tested at 50 dB HL with 40 dB HL speech masking noise in her left ear.  AzBio: 42% tested at 50 dB HL with 40 dB HL speech masking noise in her left ear.    BAHA testing information:   Patient was tested with a [employer] BAHA 5 Power device.     BAHA Behavioral testing:   Pure tones: Thresholds rising from 75 to 35 dB HL with 50 dB HL narrowband masking noise in her left ear.   CNC: 84% tested at 50 dB HL with 40 dB HL speech masking noise in her left ear.  AzBio: 64% tested at 50 dB HL with 40 dB HL speech masking noise in her left ear.    Unaided testing:   Pure tones: See previous audiogram for results.  CNC: 8% tested at 50 dB HL with 40 dB HL speech masking noise in her left ear.  AzBio: 32% tested at 50 dB HL with 40 dB HL speech masking noise in her left ear.     I reviewed the MRI IAC from 10/26/2023, both the images and the report.  There was no evidence of retrocochlear pathology.    IMPRESSION:   1) Right sensorineural hearing loss    PLAN:  I discussed the patient's sensorineural hearing loss on the right side.  I discussed  her options for hearing rehabilitation including, but not limited to, CROS hearing aids, bone-conduction devices including Baha and osia, and cochlear implantation.  In particular, I discussed cochlear implantation at length including that adjusting to the cochlear implantation takes significant amount of time up to 1 year after implantation.  I also discussed the risks of cochlear implantation including, but not limited to, bleeding, pain, infection, poor performance, loss of residual hearing, dizziness, facial weakness, change in taste, cerebrospinal fluid leak, implant infection, implant extrusion, implant malfunction, meningitis, tympanic membrane perforation, need for further procedures.  She is interested in right-sided cochlear implantation.  I would recommend a cochlear brand 632 with 622 backup if she opts for cochlear device.  If she drops for an Advanced [employer]ionics device, I would recommend slim J implant with a slim J backup.  If she opts for a MED-EL device, I would recommend a flex 28 with a flex 28 backup.  I also would recommend a prednisone taper as well as omeprazole to start 3 days before surgery.  I discussed the risks of steroids including, but not limited to, increased appetite, insomnia, mood changes, osteoporosis, stomach ulcers, avascular necrosis of the hip, reduction in her bone steroid production.  We will plan for right-sided cochlear implantation.  She will need to undergo [id number] vaccination.  I would also like her to have ECOG monitoring during cochlear implantation.    Ed Grayson MD

## 2023-12-12 NOTE — LETTER
December 12, 2023     Patient: Keyona Navas   YOB: 1990   Date of Visit: 12/12/2023       To Whom It May Concern:    Keyona Navas was seen in my clinic on 12/12/2023 at 8:00 am. Please excuse Keyona for her absence from school/work on this day to make the appointment.    If you have any questions or concerns, please don't hesitate to call [phone].         Sincerely,         Ed Grayson MD        CC: No Recipients

## 2023-12-22 NOTE — PROGRESS NOTES
Chief Complaint: Low back pain    Dear Dr. Andrews     RHD or LHD    I had the pleasure of seeing your patient, Keyona Navas, in clinic today. As you know,  she is a pleasant 32yo Female with past medical history of tobacco dependence, GERD, HLD, who presents for consultation to evaluate low back pain with radicular symptoms.     TIMELINE OF COMPLAINT(S):     Pain started on 4/19/2023.     ED Note 06/22/2023 - LBP, recently diagnosed with lumbar radiculopathy. States she ran out of her Flexeril and low back pain increased. Denied any red flag s/s, no neurologic deficit. No new imaging. Patient received IM Toradol and lidocaine patch and was discharged home.     Pain:  LOCATION-  RADIATION-  ASSOCIATED WITH-  NUMBNESS/TINGLING-  WEAKNESS-  CONSTANT or INTERMITTENT-  SEVERITY/QUANTITY-  QUALITY-  EXACERBATED BY-  BETTER WITH-  TRIED-      Anti-Inflammatories: Ibuprofen, previously prednisone, Toradol, meloxicam, naproxen      Muscle relaxants: cyclobenzaprine      Anti-depressants:      Neuroleptics:      LDN:    PHYSICAL THERAPY: Spring of 2023  TENS unit:  CHIROPRACTIC MANIPULATION:  ACUPUNCTURE TREATMENTS:  DEEP TISSUE MASSAGE THERAPY:  OSTEOPATHIC MANIPULATION THERAPY:  INJECTIONS:  EMG/NCS:    IMAGING:    === 10/05/23 ===    XR LUMBAR SPINE COMPLETE 4+ VIEWS    - Impression -  Normal radiographs of the lumbar spine    Lab Results   Component Value Date    HGBA1C 5.4 01/03/2023       FUNCTIONAL HISTORY: The patient is independent in all ADLs, mobility, and driving. The patient does not use any assistive device.    SH:  Lives in:  Lives with:  Occupation:  Tobacco:  Alcohol:  Drugs:    ROS: The patient denies any bowel or bladder incontinence/accidents, night sweats, fevers, chills, recent significant weight loss. A 14 point review of systems was reviewed with the patient and is as above and otherwise negative.  ROS questionnaire positive for     PHYSICAL EXAM    GEN - Alert, well-developed,  well-nourished, no acute distress  PSYCH - Cooperative, appropriate mood and affect  HEENT - NC/AT  RESP - Non-labored respirations, equal expansion  CV - warm and well-perfused, No cyanosis or edema in extremities. DP pulses 2+ bilaterally  ABD- soft, ND  SKIN - No rash.    NECK/EXTR- Cervical ROM is full with forward flexion, extension, rotation, and side bending with no pain. Spurlings and Lhermitte's negative. No tenderness of the cervical spinous processes. No tenderness of the cervical paraspinal muscles and trapezei musculature as well as the scapular musculature. Scapulae are symmetrical without evidence of medial or lateral winging.    Shoulder ROM full with flexion, abduction, external and internal rotation. No tenderness of SC, AC, or bicipital groove. Negative Empty can test. Negative Neer's test. Negative Hawkin's test. Negative Somervell. Negative Speed's test. Supraspinatus strength 5/5 bilaterally. Infraspinatus strength 5/5 bilaterally. Belly press negative bilaterally. Lift-off negative bilaterally. Negative apprehension.    BACK/SPINE - Symmetric posture, no erythema, edema, or swelling.  Mild left sided back pain with lumbar flexion. No pain with extension, rotation, or side bend. No pain with facet loading. No tenderness of lumbosacral spinous processes. Mild tenderness over the left lumbar paraspinal muscles. No tenderness over the iliolumbar ligaments bilaterally. No TTP of bilateral PSIS, sacral sulcus, gluteus medius, piriformis, greater trochanters, or IT band. Sacral compression negative bilaterally. Straight leg raise negative bilaterally. Sitting slump test negative bilaterally. Femoral stretch test negative bilaterally.     HIPS/PELVIS - Symmetric in standing and lying Passive hip flexion, internal rotation, and external rotation within functional normal limits bilaterally without provocation of pain symptoms. No tenderness over iliopsoas tendon.Negative FABERs bilaterally. Negative hip  clicking. hip Negative hip impingement test. Negative log roll. Negative resisted active SLR. No pain with deep hip flexion.Negative single leg hop test. Daria test negative.     Knee: No warmth, effusion, or erythema.  No popliteal fullness.  No anterior, posterior, medial or lateral instability.  No pes anserine tenderness.  There is no pain with hyperflexion of the knee.  There is no pain with varus or valgus stressing of the knee during flexion and extension.  There is no crepitus.  There is no medial or lateral joint line tenderness.    NEURO - UE strength 5/5 -  including shoulder abduction, biceps, triceps, wrist extensors, finger flexors, interossei, and    LE strength 5/5 -  including hip flexors, knee flexors, knee extensors, ankle dorsiflexors, ankle plantar flexors, and EHL   Sensation - intact to light touch in bilateral lower extremities.   Reflexes - 2+ biceps, brachioradialis, triceps, patellar and Achilles reflexes bilaterally No Clonus, No Babinski, Honeycutt's negative bilaterally  GAIT - Normal base, normal stride length, non-antalgic. Able to toe walk and heel walk without difficulty. Able to perform tandem gait without difficulty.    IMPRESSION:    This is a pleasant 32yo Female with past medical history of tobacco dependence, GERD, HLD, who presents for consultation to evaluate low back pain with radicular symptoms.        -Patient Education: Extensive time was spent educating the patient on relevant anatomy, clinical findings and imaging, as well as discussing the potential diagnoses as discussed above.           The patient expressed understanding and agreement with the assessment and plan. Patient encouraged to contact us should they have any questions, concerns, or any changes in symptoms.     Thank you for allowing me to participate in the care of your patient.      ** Dictated with voice recognition software, please forgive any errors in grammar and/or spelling **      A copy of this  consultation report was sent electronically to the referring provider,  Dr. Andrews

## 2023-12-26 ENCOUNTER — APPOINTMENT (OUTPATIENT)
Dept: PHYSICAL MEDICINE AND REHAB | Facility: CLINIC | Age: 33
End: 2023-12-26
Payer: COMMERCIAL

## 2023-12-26 NOTE — LETTER
December 22, 2023     Caitlin Andrews MD  [id number] [address] [address]    Patient: Keyona Navas   YOB: 1990   Date of Visit: 12/26/2023       Dear Dr. Caitlin Andrews MD:    Thank you for referring Keyona Navas to me for evaluation. Below are my notes for this consultation.  If you have questions, please do not hesitate to call me. I look forward to following your patient along with you.       Sincerely,     Evita Hernandez MD      CC: No Recipients  ______________________________________________________________________________________

## 2024-01-03 ENCOUNTER — TELEPHONE (OUTPATIENT)
Dept: PRIMARY CARE | Facility: CLINIC | Age: 34
End: 2024-01-03
Payer: COMMERCIAL

## 2024-01-03 NOTE — TELEPHONE ENCOUNTER
Pt says that she wants Dr Andrews to be her primary care doctor only went back to her old pcp because she wanted refills for her medication and Dr Andrews wouldn't fill them until her appointment on 1/17. So she says she is going to keep scheduling appointments with her because she wants her to be her pcp.

## 2024-01-09 ENCOUNTER — CLINICAL SUPPORT (OUTPATIENT)
Dept: AUDIOLOGY | Facility: CLINIC | Age: 34
End: 2024-01-09
Payer: COMMERCIAL

## 2024-01-09 DIAGNOSIS — H90.41 SENSORINEURAL HEARING LOSS (SNHL) OF RIGHT EAR WITH UNRESTRICTED HEARING OF LEFT EAR: Primary | ICD-10-CM

## 2024-01-09 PROCEDURE — 92626 EVAL AUD FUNCJ 1ST HOUR: CPT | Performed by: AUDIOLOGIST

## 2024-01-09 NOTE — LETTER
January 9, 2024     Patient: Keyona Navas   YOB: 1990   Date of Visit: 1/9/2024       To Whom It May Concern:    Keyona Navas was seen in my clinic on 1/9/2024 at 9:00 am. Please excuse Keyona for her absence from work on this day to make the appointment.    If you have any questions or concerns, please don't hesitate to call.         Sincerely,         Daxa Sauer, MANJEET, CCC-A        CC: No Recipients

## 2024-01-09 NOTE — PROGRESS NOTES
Cochlear implant device selection and counseling    HISTORY:  Patient was seen by the Audiology Department for Cochlear Implant device selection and counseling.   She was seen for an audiologic evaluation in June at [facility] and [facility] where she was then referred here for a cochlear implant/ osseointegrated device evaluation which took place on 9/25/2023. She has case history significant for sensorineural hearing loss on her right side with unrestricted hearing on her left. She reported that she wore a hearing aid on her right side in childhood through the age of [age]. She stated that she has recently started a job at the [employer] and has noticed more of a struggle with her hearing loss. She reported that she is ready to pursue hearing assistive technology and explore her options therein. Upon ENT consultation, Ms. Navas plans to move forward with a right cochlear implant.       RESULTS:  The Cochlear Implant Quality of Life-35 (CIQOL-35) Profile was administered today. Patients responses gave the following raw scores and converted scores:  Communication = 20 raw, 32.28 converted  Emotional = 18 raw, 62.86 converted  Entertainment = 23 raw, 79.42 converted  Environment = 16 raw, 48.85 converted  Listening Effort = 7 raw, 15.88 converted  Social = 16 raw, 53.03 converted  CIQOL-10 Global 29 raw, 44.98 converted      Ms. Navas has a well-documented moderate sensorineural hearing loss in the right ear. Due to the lack of benefit from appropriate amplification, she has expressed interest in a cochlear implant.  Testing shows poor sentence and word understanding, even with an appropriately programmed hearing aid indicating a severe to profound functional impairment.  The audiologic findings demonstrate that Ms. Navas has partial residual hearing for tonal stimuli and speech detection with a hearing aid, demonstrating that the auditory cranial nerve can be stimulated. However, a hearing aid is not strong  enough to provide benefit for speech understanding due to the severity of the hearing loss in the right ear. On this basis, patient is judged to be an audiologic candidate for a cochlear implant.  Patient/family have been counseled regarding the post-operative cochlear implant protocol and are properly motivated and able to participate in the post cochlear implant rehabilitation program. Reasonable Expectations were discussed and completed with the patient/family. They have been given written and recorded information regarding cochlear implant candidacy and protocol. Physical device examples were demonstrated today. Ms. Navas is interested in the Cochlear Be my eyess system Kanso 2 system in Black.  Final device selection was completed today, order form will be forwarded.    Patient has been previously counseled on the treatment options for single sided deafness (SSD) including a CROS aid system, a BAHA implant or a cochlear implant. She is interested in pursuing a cochlear implant for SSD. She wishes to achieve a more balanced sound and attempt to restore hearing in her right ear to achieve binaural hearing again.           TREATMENT PLAN:  1. Follow-up with Dr. Grayson regarding test results.  2. From an audiologic standpoint, patient is a candidate for a cochlear implant.  3. Further recommendations following surgical consult.    [phone]    Genaro Zuñiga, CCC-A

## 2024-01-09 NOTE — LETTER
January 9, 2024     Dulce Maria España, PhD, CCC-A  [id number]  Audiology Services  [facility] [address]    Patient: Keyona Navas   YOB: 1990   Date of Visit: 1/9/2024       Dear Dr. Dulce Maria España, PhD, CCC-A:    Thank you for referring Keyona Navas to me for evaluation. Below are my notes for this consultation.  If you have questions, please do not hesitate to call me. I look forward to following your patient along with you.       Sincerely,     Daxa Sauer, MANJEET, CCC-A      CC: No Recipients  ______________________________________________________________________________________    Cochlear implant device selection and counseling    HISTORY:  Patient was seen by the Audiology Department for Cochlear Implant device selection and counseling.   She was seen for an audiologic evaluation in June at [facility] where she was then referred here for a cochlear implant/ osseointegrated device evaluation which took place on 9/25/2023. She has case history significant for sensorineural hearing loss on her right side with unrestricted hearing on her left. She reported that she wore a hearing aid on her right side in childhood through the age of [age]. She stated that she has recently started a job at the [employer] office and has noticed more of a struggle with her hearing loss. She reported that she is ready to pursue hearing assistive technology and explore her options therein. Upon ENT consultation, Ms. Navas plans to move forward with a right cochlear implant.       RESULTS:  The Cochlear Implant Quality of Life-35 (CIQOL-35) Profile was administered today. Patients responses gave the following raw scores and converted scores:  Communication = 20 raw, 32.28 converted  Emotional = 18 raw, 62.86 converted  Entertainment = 23 raw, 79.42 converted  Environment = 16 raw, 48.85 converted  Listening Effort = 7 raw, 15.88 converted  Social = 16 raw, 53.03 converted  CIQOL-10  Global 29 raw, 44.98 converted      Ms. Navsa has a well-documented moderate sensorineural hearing loss in the right ear. Due to the lack of benefit from appropriate amplification, she has expressed interest in a cochlear implant.  Testing shows poor sentence and word understanding, even with an appropriately programmed hearing aid indicating a severe to profound functional impairment.  The audiologic findings demonstrate that Ms. Navas has partial residual hearing for tonal stimuli and speech detection with a hearing aid, demonstrating that the auditory cranial nerve can be stimulated. However, a hearing aid is not strong enough to provide benefit for speech understanding due to the severity of the hearing loss in the right ear. On this basis, patient is judged to be an audiologic candidate for a cochlear implant.  Patient/family have been counseled regarding the post-operative cochlear implant protocol and are properly motivated and able to participate in the post cochlear implant rehabilitation program. Reasonable Expectations were discussed and completed with the patient/family. They have been given written and recorded information regarding cochlear implant candidacy and protocol. Physical device examples were demonstrated today. Ms. Navas is interested in the Cochlear GoodyTag system Kanso 2 system in Black.  Final device selection was completed today, order form will be forwarded.    Patient has been previously counseled on the treatment options for single sided deafness (SSD) including a CROS aid system, a BAHA implant or a cochlear implant. She is interested in pursuing a cochlear implant for SSD. She wishes to achieve a more balanced sound and attempt to restore hearing in her right ear to achieve binaural hearing again.           TREATMENT PLAN:  1. Follow-up with Dr. Grayson regarding test results.  2. From an audiologic standpoint, patient is a candidate for a cochlear implant.  3. Further  recommendations following surgical consult.    [id number]    Genaro Zuñiga, CCC-A

## 2024-01-17 ENCOUNTER — APPOINTMENT (OUTPATIENT)
Dept: PRIMARY CARE | Facility: CLINIC | Age: 34
End: 2024-01-17
Payer: COMMERCIAL

## 2024-01-22 ENCOUNTER — TELEPHONE (OUTPATIENT)
Dept: OTOLARYNGOLOGY | Facility: CLINIC | Age: 34
End: 2024-01-22
Payer: COMMERCIAL

## 2024-01-22 NOTE — TELEPHONE ENCOUNTER
Contacted the patient at [phone] to confirm a date for her cochlear implant surgery with Dr. Grayson. Voice message left with office contact information and will await a response.

## 2024-01-23 ENCOUNTER — HOSPITAL ENCOUNTER (OUTPATIENT)
Facility: CLINIC | Age: 34
Setting detail: OUTPATIENT SURGERY
End: 2024-01-23
Attending: STUDENT IN AN ORGANIZED HEALTH CARE EDUCATION/TRAINING PROGRAM | Admitting: STUDENT IN AN ORGANIZED HEALTH CARE EDUCATION/TRAINING PROGRAM
Payer: COMMERCIAL

## 2024-01-23 DIAGNOSIS — H90.3 SNHL (SENSORY-NEURAL HEARING LOSS), ASYMMETRICAL: ICD-10-CM

## 2024-01-29 ENCOUNTER — TELEPHONE (OUTPATIENT)
Dept: OTOLARYNGOLOGY | Facility: CLINIC | Age: 34
End: 2024-01-29
Payer: COMMERCIAL

## 2024-01-29 DIAGNOSIS — Z01.818 PRE-OP TESTING: ICD-10-CM

## 2024-01-29 NOTE — TELEPHONE ENCOUNTER
Contacted the patient at [phone] regarding her upcoming procedure and labs needed prior to 2/7. Left voice message regarding the labs and where to have them drawn prior to surgery.

## 2024-01-31 ENCOUNTER — OFFICE VISIT (OUTPATIENT)
Dept: PHYSICAL MEDICINE AND REHAB | Facility: CLINIC | Age: 34
End: 2024-01-31
Payer: COMMERCIAL

## 2024-01-31 VITALS
TEMPERATURE: 97.7 F | BODY MASS INDEX: 23.99 KG/M2 | OXYGEN SATURATION: 99 % | SYSTOLIC BLOOD PRESSURE: 130 MMHG | DIASTOLIC BLOOD PRESSURE: 77 MMHG | WEIGHT: 144 LBS | HEIGHT: 65 IN | HEART RATE: 98 BPM

## 2024-01-31 DIAGNOSIS — M54.16 LUMBAR RADICULOPATHY: ICD-10-CM

## 2024-01-31 DIAGNOSIS — M47.817 LUMBOSACRAL SPONDYLOSIS WITHOUT MYELOPATHY: Primary | ICD-10-CM

## 2024-01-31 DIAGNOSIS — M79.18 MYOFASCIAL PAIN: ICD-10-CM

## 2024-01-31 PROCEDURE — 99214 OFFICE O/P EST MOD 30 MIN: CPT | Performed by: PHYSICAL MEDICINE & REHABILITATION

## 2024-01-31 PROCEDURE — 3008F BODY MASS INDEX DOCD: CPT | Performed by: PHYSICAL MEDICINE & REHABILITATION

## 2024-01-31 RX ORDER — METHOCARBAMOL 500 MG/1
500-1000 TABLET, FILM COATED ORAL 4 TIMES DAILY PRN
Qty: 240 TABLET | Refills: 1 | Status: SHIPPED | OUTPATIENT
Start: 2024-01-31 | End: 2024-03-31

## 2024-01-31 ASSESSMENT — PAIN SCALES - GENERAL: PAINLEVEL: 2

## 2024-01-31 NOTE — PATIENT INSTRUCTIONS
-Licart patch sample provided, let me know if it helps  -Start Robaxin up to 4 times per day  -Consider other medications in the future  -Core exercises provided, start doing them daily  -Lumbar MRI ordered  -Consider injections: Trigger point injections (handout provided), referral for spine injections  -Functional capacity evaluation ordered  -I will fill out the work duty restrictions form after I get the functional capacity evaluation  -Do not wear your brace for more than 2 to 3 hours/day  -Follow-up after MRI
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
## 2024-02-01 ENCOUNTER — TELEPHONE (OUTPATIENT)
Dept: OTOLARYNGOLOGY | Facility: CLINIC | Age: 34
End: 2024-02-01
Payer: COMMERCIAL

## 2024-02-01 NOTE — TELEPHONE ENCOUNTER
Attempted to reach patient at [phone] but was unsuccessful. I was able to reach the patient's mother, Blanca Navas (+1 alternate) at [phone]. She says that her daughter likely doesn't answer because she is working. I told her that PAT was trying to reach her regarding her upcoming surgery on 2/7 but have been unsuccessful. She said that she would text her daughter the message including the contact number for [facility] ([phone]) and my contact number ([phone]).

## 2024-02-06 ENCOUNTER — ANESTHESIA EVENT (OUTPATIENT)
Dept: OPERATING ROOM | Facility: CLINIC | Age: 34
End: 2024-02-06

## 2024-02-06 RX ORDER — SODIUM CHLORIDE, SODIUM LACTATE, POTASSIUM CHLORIDE, CALCIUM CHLORIDE 600; 310; 30; 20 MG/100ML; MG/100ML; MG/100ML; MG/100ML
100 INJECTION, SOLUTION INTRAVENOUS CONTINUOUS
Status: CANCELLED | OUTPATIENT
Start: 2024-02-06

## 2024-02-06 RX ORDER — OXYCODONE HYDROCHLORIDE 5 MG/1
5 TABLET ORAL EVERY 4 HOURS PRN
Status: CANCELLED | OUTPATIENT
Start: 2024-02-06

## 2024-02-06 RX ORDER — OXYCODONE HYDROCHLORIDE 10 MG/1
10 TABLET ORAL EVERY 4 HOURS PRN
Status: CANCELLED | OUTPATIENT
Start: 2024-02-06

## 2024-02-06 RX ORDER — FENTANYL CITRATE 50 UG/ML
25 INJECTION, SOLUTION INTRAMUSCULAR; INTRAVENOUS EVERY 5 MIN PRN
Status: CANCELLED | OUTPATIENT
Start: 2024-02-06

## 2024-02-06 RX ORDER — APREPITANT 40 MG/1
40 CAPSULE ORAL DAILY
Status: CANCELLED | OUTPATIENT
Start: 2024-02-06

## 2024-02-06 RX ORDER — LIDOCAINE IN NACL,ISO-OSMOT/PF 30 MG/3 ML
0.1 SYRINGE (ML) INJECTION ONCE
Status: CANCELLED | OUTPATIENT
Start: 2024-02-06 | End: 2024-02-06

## 2024-02-06 RX ORDER — ONDANSETRON HYDROCHLORIDE 2 MG/ML
4 INJECTION, SOLUTION INTRAVENOUS ONCE AS NEEDED
Status: CANCELLED | OUTPATIENT
Start: 2024-02-06

## 2024-02-07 ENCOUNTER — ANESTHESIA (OUTPATIENT)
Dept: OPERATING ROOM | Facility: CLINIC | Age: 34
End: 2024-02-07
Payer: COMMERCIAL

## 2024-02-13 ENCOUNTER — APPOINTMENT (OUTPATIENT)
Dept: OTOLARYNGOLOGY | Facility: CLINIC | Age: 34
End: 2024-02-13
Payer: COMMERCIAL

## 2024-02-23 ENCOUNTER — TELEPHONE (OUTPATIENT)
Dept: PHYSICAL MEDICINE AND REHAB | Facility: CLINIC | Age: 34
End: 2024-02-23
Payer: COMMERCIAL

## 2024-02-23 NOTE — TELEPHONE ENCOUNTER
Earnestine, Pls let this patient know that her insurance is denying the MRI because she did not do PT in the last 6 months. The last time she did it was in May 2023. Does she want me to order the PT with my specific instructions and then we can re-order the MRI after 6 weeks of that if needed?    This pt is scheduled for CPT 40188 on Tuesday, 2/27 at [place].  However, [facility] insurance has denied the prior authorization request for [id number].  Please see the attached denial rationale letter with offer for a [id number] or appeal option.  To speak with a Clinical Peer Reviewer, please call [phone] within 5 business days for reconsideration.      Case Reference # [id number]

## 2024-02-28 ENCOUNTER — APPOINTMENT (OUTPATIENT)
Dept: AUDIOLOGY | Facility: CLINIC | Age: 34
End: 2024-02-28
Payer: COMMERCIAL

## 2024-03-05 ENCOUNTER — TELEPHONE (OUTPATIENT)
Dept: OCCUPATIONAL THERAPY | Facility: CLINIC | Age: 34
End: 2024-03-05

## 2024-03-05 NOTE — TELEPHONE ENCOUNTER
Called and left voicemail on 2/04 and 2/05 that her appointment is cancelled because we do not do FCE here

## 2024-03-06 ENCOUNTER — APPOINTMENT (OUTPATIENT)
Dept: OCCUPATIONAL THERAPY | Facility: CLINIC | Age: 34
End: 2024-03-06

## 2024-03-13 ENCOUNTER — APPOINTMENT (OUTPATIENT)
Dept: AUDIOLOGY | Facility: CLINIC | Age: 34
End: 2024-03-13
Payer: COMMERCIAL

## 2024-03-14 ENCOUNTER — APPOINTMENT (OUTPATIENT)
Dept: RADIOLOGY | Facility: HOSPITAL | Age: 34
End: 2024-03-14
Payer: COMMERCIAL

## 2024-03-26 ENCOUNTER — TELEPHONE (OUTPATIENT)
Dept: OTOLARYNGOLOGY | Facility: CLINIC | Age: 34
End: 2024-03-26

## 2024-03-26 NOTE — TELEPHONE ENCOUNTER
Unsuccessful attempt made to contact the patient at [phone] to discuss rescheduling cochlear implantation. Left message with contact information to call back.

## 2024-04-10 ENCOUNTER — APPOINTMENT (OUTPATIENT)
Dept: AUDIOLOGY | Facility: CLINIC | Age: 34
End: 2024-04-10
Payer: COMMERCIAL

## 2024-05-09 ENCOUNTER — HOSPITAL ENCOUNTER (OUTPATIENT)
Dept: RADIOLOGY | Facility: CLINIC | Age: 34
End: 2024-05-09
Payer: COMMERCIAL

## 2024-05-22 ENCOUNTER — APPOINTMENT (OUTPATIENT)
Dept: PRIMARY CARE | Facility: CLINIC | Age: 34
End: 2024-05-22
Payer: COMMERCIAL

## 2024-05-22 ENCOUNTER — APPOINTMENT (OUTPATIENT)
Dept: AUDIOLOGY | Facility: CLINIC | Age: 34
End: 2024-05-22
Payer: COMMERCIAL

## 2024-07-01 ENCOUNTER — APPOINTMENT (OUTPATIENT)
Dept: PHYSICAL MEDICINE AND REHAB | Facility: CLINIC | Age: 34
End: 2024-07-01
Payer: COMMERCIAL

## 2024-07-08 ENCOUNTER — OFFICE VISIT (OUTPATIENT)
Dept: OBSTETRICS AND GYNECOLOGY | Facility: CLINIC | Age: 34
End: 2024-07-08
Payer: COMMERCIAL

## 2024-07-08 VITALS
WEIGHT: 152 LBS | SYSTOLIC BLOOD PRESSURE: 112 MMHG | HEIGHT: 65 IN | BODY MASS INDEX: 25.33 KG/M2 | DIASTOLIC BLOOD PRESSURE: 76 MMHG

## 2024-07-08 DIAGNOSIS — Z87.42 HISTORY OF ABNORMAL CERVICAL PAP SMEAR: ICD-10-CM

## 2024-07-08 DIAGNOSIS — Z01.419 WELL WOMAN EXAM WITH ROUTINE GYNECOLOGICAL EXAM: Primary | ICD-10-CM

## 2024-07-08 PROCEDURE — 87624 HPV HI-RISK TYP POOLED RSLT: CPT

## 2024-07-08 PROCEDURE — 88141 CYTOPATH C/V INTERPRET: CPT | Performed by: PATHOLOGY

## 2024-07-08 PROCEDURE — 99385 PREV VISIT NEW AGE 18-39: CPT | Performed by: NURSE PRACTITIONER

## 2024-07-08 PROCEDURE — 3008F BODY MASS INDEX DOCD: CPT | Performed by: NURSE PRACTITIONER

## 2024-07-08 PROCEDURE — 88175 CYTOPATH C/V AUTO FLUID REDO: CPT

## 2024-07-08 PROCEDURE — 87661 TRICHOMONAS VAGINALIS AMPLIF: CPT

## 2024-07-08 PROCEDURE — 87491 CHLMYD TRACH DNA AMP PROBE: CPT

## 2024-07-08 PROCEDURE — 87591 N.GONORRHOEAE DNA AMP PROB: CPT

## 2024-07-08 ASSESSMENT — ENCOUNTER SYMPTOMS
PSYCHIATRIC NEGATIVE: 0
PSYCHIATRIC NEGATIVE: 1
RESPIRATORY NEGATIVE: 0
DEPRESSION: 0
NEUROLOGICAL NEGATIVE: 0
CARDIOVASCULAR NEGATIVE: 0
CARDIOVASCULAR NEGATIVE: 1
HEMATOLOGIC/LYMPHATIC NEGATIVE: 1
ALLERGIC/IMMUNOLOGIC NEGATIVE: 0
EYES NEGATIVE: 1
MUSCULOSKELETAL NEGATIVE: 0
HEMATOLOGIC/LYMPHATIC NEGATIVE: 0
ENDOCRINE NEGATIVE: 1
LOSS OF SENSATION IN FEET: 0
ENDOCRINE NEGATIVE: 0
MUSCULOSKELETAL NEGATIVE: 1
GASTROINTESTINAL NEGATIVE: 0
RESPIRATORY NEGATIVE: 1
CONSTITUTIONAL NEGATIVE: 1
GASTROINTESTINAL NEGATIVE: 1
CONSTITUTIONAL NEGATIVE: 0
EYES NEGATIVE: 0
NEUROLOGICAL NEGATIVE: 1
OCCASIONAL FEELINGS OF UNSTEADINESS: 0
ALLERGIC/IMMUNOLOGIC NEGATIVE: 1

## 2024-07-08 ASSESSMENT — LIFESTYLE VARIABLES
AUDIT-C TOTAL SCORE: 0
HOW MANY STANDARD DRINKS CONTAINING ALCOHOL DO YOU HAVE ON A TYPICAL DAY: PATIENT DOES NOT DRINK
HOW OFTEN DO YOU HAVE A DRINK CONTAINING ALCOHOL: NEVER
SKIP TO QUESTIONS 9-10: 1
HOW OFTEN DO YOU HAVE SIX OR MORE DRINKS ON ONE OCCASION: NEVER

## 2024-07-08 ASSESSMENT — PAIN SCALES - GENERAL: PAINLEVEL: 0-NO PAIN

## 2024-07-08 NOTE — PROGRESS NOTES
Yoly Cox, APRN-CNP     Subjective   Keyona Navas is a 34 y.o. female who presents for annual exam.   Patient is new to this office but has been seen by providers at main [facility].    History was reviewed and significant for multiple abnormal Pap smears primarily LSIL with ASCUS H and positive HPV as well.  She has had colposcopies with JARAD-1 changes and negative ECC's.  She has not had any surgical surgery.    Patient has her second Mirena IUD in place.  She is currently without a sexual partner but does report that she has had pelvic pain with sex with the IUD in place.    Patient also reports frequent bacterial vaginosis infections.  Denies douching denies internal washing and reports she does not use soap in the vulvar area.  Reports she has changed her soap powder and continues to get bacterial infections.  Past Medical History:   Diagnosis Date    Acute vaginitis 01/03/2023    Bacterial vaginosis    Candidiasis, unspecified 08/11/2020    Yeast infection    Encounter for gynecological examination (general) (routine) without abnormal findings 09/30/2019    Well female exam with routine gynecological exam    Encounter for insertion of intrauterine contraceptive device     Encounter for IUD insertion    Nausea 08/11/2020    Nausea in adult    Nontraumatic compartment syndrome of left lower extremity 01/05/2016    Compartment syndrome of left lower extremity due to exertion    Nontraumatic compartment syndrome of right lower extremity 01/05/2016    Compartment syndrome of right lower extremity due to exertion    Nontraumatic compartment syndrome of unspecified lower extremity 09/29/2015    Exertional compartment syndrome of lower extremity    Other chest pain 04/28/2017    Atypical chest pain    Other conditions influencing health status     History of dyspareunia    Other conditions influencing health status 12/22/2015    Exertional compartment syndrome    Other problems related to lifestyle     Other  "problems related to lifestyle    Other specified disorders of bone, lower leg 2015    Bilateral tibial pain    Other specified health status     No pertinent past medical history    Pain in right knee 2015    Bilateral knee pain    Pain in right leg 2015    Leg pain, bilateral    Personal history of other diseases of the digestive system 2017    History of gastroesophageal reflux (GERD)    Personal history of other diseases of the female genital tract 10/01/2014    History of ovarian cyst    Personal history of other specified conditions 2015    History of urinary frequency     No past surgical history on file.    OB History          0    Para   0    Term   0       0    AB   0    Living   0         SAB   0    IAB   0    Ectopic   0    Multiple   0    Live Births   0               No LMP recorded (lmp unknown).      Review of Systems   Constitutional: Negative.    HENT: Negative.     Eyes: Negative.    Respiratory: Negative.     Cardiovascular: Negative.    Gastrointestinal: Negative.    Endocrine: Negative.    Genitourinary: Negative.    Musculoskeletal: Negative.    Skin: Negative.    Allergic/Immunologic: Negative.    Neurological: Negative.    Hematological: Negative.    Psychiatric/Behavioral: Negative.     All other systems reviewed and are negative.    Breast: No Complaints   Vaginal: Discharge and Odor        Objective   /76   Ht 1.651 m (5' 5\")   Wt 68.9 kg (152 lb)   LMP  (LMP Unknown)   BMI 25.29 kg/m²   Physical Exam  Constitutional:       Appearance: Normal appearance. She is normal weight.   Genitourinary:      Vulva and rectum normal.      Right Labia: No rash.     Left Labia: No rash.     Vaginal discharge present.      No vaginal prolapse present.     No vaginal atrophy present.       Right Adnexa: no mass present.     Left Adnexa: no mass present.     Cervix is nulliparous.      No cervical motion tenderness.      IUD strings visualized.      " Uterus is not enlarged.   Breasts:     Right: Normal.      Left: Normal.   Cardiovascular:      Rate and Rhythm: Normal rate.      Heart sounds: Normal heart sounds.   Pulmonary:      Effort: Pulmonary effort is normal.      Breath sounds: Normal breath sounds.   Abdominal:      Palpations: Abdomen is soft.   Musculoskeletal:         General: Normal range of motion.      Cervical back: Normal range of motion.   Neurological:      General: No focal deficit present.      Mental Status: She is alert.   Skin:     General: Skin is warm and dry.   Vitals and nursing note reviewed.                 Assessment/Plan   Problem List Items Addressed This Visit    None  Visit Diagnoses         Codes    Well woman exam with routine gynecological exam    -  Primary Z01.419    Relevant Orders    THINPREP PAP TEST    History of abnormal cervical Pap smear     Z87.42    Relevant Orders    THINPREP PAP TEST        If Pap is abnormal and colposcopy recommended patient may address with MD the possibility of a prophylactic LEEP to try to stop the continued abnormal Pap smears.  Also reviewed with patient the importance of stopping tobacco use.    If continued bacterial vaginosis concerns may consider removal of IUD and in the future replacing it with a Kyleena.

## 2024-07-10 LAB
C TRACH RRNA SPEC QL NAA+PROBE: NEGATIVE
N GONORRHOEA DNA SPEC QL PROBE+SIG AMP: NEGATIVE
T VAGINALIS RRNA SPEC QL NAA+PROBE: NEGATIVE

## 2024-07-17 LAB
CYTOLOGY CMNT CVX/VAG CYTO-IMP: NORMAL
HPV HR 12 DNA GENITAL QL NAA+PROBE: NEGATIVE
HPV HR GENOTYPES PNL CVX NAA+PROBE: NEGATIVE
HPV16 DNA SPEC QL NAA+PROBE: NEGATIVE
HPV18 DNA SPEC QL NAA+PROBE: NEGATIVE
LAB AP CONTRACEPTIVE HISTORY: NORMAL
LAB AP HPV GENOTYPE QUESTION: YES
LAB AP HPV HR: NORMAL
LAB AP PAP ADDITIONAL TESTS: NORMAL
LAB AP PREVIOUS ABNORMAL HISTORY: NORMAL
LAB AP TREATMENT HISTORY: NORMAL
LABORATORY COMMENT REPORT: NORMAL
PATH REPORT.TOTAL CANCER: NORMAL

## 2024-08-13 ENCOUNTER — APPOINTMENT (OUTPATIENT)
Dept: PRIMARY CARE | Facility: CLINIC | Age: 34
End: 2024-08-13
Payer: COMMERCIAL

## 2024-08-14 ENCOUNTER — OFFICE VISIT (OUTPATIENT)
Dept: PRIMARY CARE | Facility: CLINIC | Age: 34
End: 2024-08-14
Payer: COMMERCIAL

## 2024-08-14 VITALS
HEIGHT: 65 IN | SYSTOLIC BLOOD PRESSURE: 121 MMHG | BODY MASS INDEX: 23.81 KG/M2 | DIASTOLIC BLOOD PRESSURE: 86 MMHG | HEART RATE: 76 BPM | WEIGHT: 142.9 LBS | TEMPERATURE: 97.8 F

## 2024-08-14 DIAGNOSIS — T78.40XD ALLERGY, SUBSEQUENT ENCOUNTER: ICD-10-CM

## 2024-08-14 DIAGNOSIS — F17.200 TOBACCO DEPENDENCE: ICD-10-CM

## 2024-08-14 DIAGNOSIS — Z71.6 ENCOUNTER FOR SMOKING CESSATION COUNSELING: ICD-10-CM

## 2024-08-14 DIAGNOSIS — E55.9 VITAMIN D DEFICIENCY: Primary | ICD-10-CM

## 2024-08-14 PROCEDURE — 99213 OFFICE O/P EST LOW 20 MIN: CPT | Performed by: FAMILY MEDICINE

## 2024-08-14 PROCEDURE — 3008F BODY MASS INDEX DOCD: CPT | Performed by: FAMILY MEDICINE

## 2024-08-14 RX ORDER — BUPROPION HYDROCHLORIDE 150 MG/1
TABLET, EXTENDED RELEASE ORAL
Qty: 180 TABLET | Refills: 3 | Status: SHIPPED | OUTPATIENT
Start: 2024-08-14

## 2024-08-14 NOTE — PROGRESS NOTES
Patient is here to reestablish care with me    She is still struggling with smoking    And also vitamin D    I counseled her on lifestyle changes and also referred her to smoking cessation class started her back on Wellbutrin    Allergy asthma under control    Advised patient to take vitamin D supplement    Advised her to come for her annual physical in March

## 2024-09-26 ENCOUNTER — OFFICE VISIT (OUTPATIENT)
Dept: URGENT CARE | Age: 34
End: 2024-09-26
Payer: COMMERCIAL

## 2024-09-26 VITALS
HEART RATE: 85 BPM | BODY MASS INDEX: 22.47 KG/M2 | DIASTOLIC BLOOD PRESSURE: 88 MMHG | RESPIRATION RATE: 16 BRPM | SYSTOLIC BLOOD PRESSURE: 129 MMHG | TEMPERATURE: 98.1 F | WEIGHT: 135 LBS

## 2024-09-26 DIAGNOSIS — M54.31 SCIATICA OF RIGHT SIDE: ICD-10-CM

## 2024-09-26 DIAGNOSIS — S39.012A BACK STRAIN, INITIAL ENCOUNTER: Primary | ICD-10-CM

## 2024-09-26 ASSESSMENT — PATIENT HEALTH QUESTIONNAIRE - PHQ9
1. LITTLE INTEREST OR PLEASURE IN DOING THINGS: NOT AT ALL
2. FEELING DOWN, DEPRESSED OR HOPELESS: NOT AT ALL
SUM OF ALL RESPONSES TO PHQ9 QUESTIONS 1 AND 2: 0

## 2024-09-26 ASSESSMENT — PAIN SCALES - GENERAL: PAINLEVEL: 7

## 2024-09-26 NOTE — PROGRESS NOTES
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  No bowel/bladder trouble.  No numbness/weakness in LE. Pt states that she took motrin with some decrease in pain, but no resolution.  Pt states that this is the first time that she is filing C claim, but has had pain in her back for the past 2 years since she started the new job.  Pt states that she was seeing her PCP and was in the ER had US over the years and also saw pain management.  Pt had MRI before that showed herniated disk in her lumbar spine and pain management requested another MRI before injections, but it has not been approved by pt's insurance yet.  Pt states that she has not had PT in the past.  Pt was taking steroids with temporary relief.  Pt has muscle relaxant at home.                     Physical Exam  Constitutional:       Appearance: Normal appearance.   HENT:      Head: Normocephalic.      Nose: Nose normal.   Eyes:      Extraocular Movements: Extraocular movements intact.      Pupils: Pupils are equal, round, and reactive to light.   Cardiovascular:      Rate and Rhythm: Normal rate and regular rhythm.   Pulmonary:      Effort: Pulmonary effort is normal.      Breath sounds: Normal breath sounds.   Musculoskeletal:         General: Tenderness present. No swelling.      Cervical back: Normal range of motion.      Right lower leg: No edema.      Left lower leg: No edema.   Skin:     Findings: No bruising.   Neurological:      General: No focal deficit present.      Mental Status: She is alert and oriented to person, place, and time.   Psychiatric:         Mood and Affect: Mood normal.     +tndop over lower midback w/o swelling/bruising/deformity  +pain with right leg elevation  Limited ROM in the back with flexion/extension secondary to pain       A/P:   Back strain with right sided sciatica    Stretching. Biofreeze.  Ice.  Rest. Continue with Tylenol/Motrin as  needed for pain.  Follow up with back specialist  for further evaluation.  Keep a diary of symptoms.  Go to the ER if starts getting worse.   SEE MEDCO-14 for restrictions.          Patient disposition: Home

## 2024-10-16 ENCOUNTER — OFFICE VISIT (OUTPATIENT)
Dept: ORTHOPEDIC SURGERY | Facility: CLINIC | Age: 34
End: 2024-10-16
Payer: COMMERCIAL

## 2024-10-16 VITALS — WEIGHT: 135 LBS | BODY MASS INDEX: 22.47 KG/M2

## 2024-10-16 DIAGNOSIS — S39.012A BACK STRAIN, INITIAL ENCOUNTER: ICD-10-CM

## 2024-10-16 DIAGNOSIS — M54.31 SCIATICA OF RIGHT SIDE: ICD-10-CM

## 2024-10-16 PROCEDURE — 99204 OFFICE O/P NEW MOD 45 MIN: CPT | Performed by: PHYSICIAN ASSISTANT

## 2024-10-16 PROCEDURE — 99214 OFFICE O/P EST MOD 30 MIN: CPT | Performed by: PHYSICIAN ASSISTANT

## 2024-10-16 ASSESSMENT — PAIN SCALES - GENERAL: PAINLEVEL_OUTOF10: 10-WORST PAIN EVER

## 2024-10-16 NOTE — LETTER
October 16, 2024     Patient: Keyona Navas   YOB: 1990   Date of Visit: 10/16/2024       To Whom It May Concern:    Keyona Navas was seen in my clinic on 10/16/2024 at 1:00 pm. Please excuse Keyona for her absence from work on this day to make the appointment.    If you have any questions or concerns, please don't hesitate to call.         Sincerely,         Steph Reilly PA-C        CC: No Recipients

## 2024-10-16 NOTE — PROGRESS NOTES
Keyona is a 34-year-old female reporting to clinic today for evaluation of her acute on chronic low back pain.    Her symptoms started in November 2022, she states this was after a work injury.  She did not file a Worker's Compensation claim.  She was not evaluated by provider until April 2023, she saw her PCP who recommended physical therapy, steroids, and muscle relaxers.  She was evaluated by [facility] in January.    2 weeks ago her pain flared up after another work injury.  She was seen in urgent care on 9/26.  She states this is not a Worker's Compensation claim.  She has right-sided low back pain with pain radiating down her posterior right leg into her foot.  She has no left leg symptoms.  Her symptoms are increased with bending.  She denies weakness, dragging or tripping over her feet.  She has full control of her bowel and bladder.    She has not completed any recent treatment.  She did many months of physical therapy last year.    Family, social, and medical histories are obtained and reviewed.    ROS: All other systems have been reviewed and are negative except as previously noted in history of present illness.    Physical Exam:  Const: Well-appearing, well-nourished female in no distress.  Eyes: Normal appearing sclera and conjunctiva, no jaundice, pupils normal in appearance.  Resp: breathing comfortably, normal respiratory rate.  CV: No upper or lower extremity edema.  Musculoskeletal: Normal gait.  Lumbar ROM is supple.  Strength exam of the lower extremities reveals 5/5 strength in all major muscle groups.  Negative straight leg raise bilaterally.  Neuro: Sensation is intact and equal bilaterally. Deep tendon reflexes are normal and symmetric.  No clonus.  Skin: Intact without any lesions, normal turgor.  Psych: Alert and oriented x3, normal mood and affect.    I personally reviewed x-rays of the lumbar spine from October 2023.  There is no evidence of acute fracture or instability.  Unremarkable plain  films of the lumbar spine.    The plan is to start conservative treatment for acute on chronic low back pain with right leg radiculopathy.  A referral for physical therapy was provided today.  She was provided with a note that she was here today, for work.  If she does not have improvement after 6 weeks physical therapy she should follow-up with me at which I will consider an MRI of the lumbar spine.    **This note was dictated using speech recognition software and was not corrected for spelling or grammatical errors**

## 2024-11-25 ENCOUNTER — APPOINTMENT (OUTPATIENT)
Dept: PRIMARY CARE | Facility: CLINIC | Age: 34
End: 2024-11-25
Payer: COMMERCIAL

## 2024-11-25 VITALS
HEIGHT: 65 IN | DIASTOLIC BLOOD PRESSURE: 82 MMHG | HEART RATE: 86 BPM | WEIGHT: 134 LBS | TEMPERATURE: 97.7 F | BODY MASS INDEX: 22.33 KG/M2 | SYSTOLIC BLOOD PRESSURE: 124 MMHG

## 2024-11-25 DIAGNOSIS — S33.5XXS LUMBAR SPRAIN, SEQUELA: Primary | ICD-10-CM

## 2024-11-25 PROCEDURE — 3008F BODY MASS INDEX DOCD: CPT | Performed by: FAMILY MEDICINE

## 2024-11-25 PROCEDURE — 99213 OFFICE O/P EST LOW 20 MIN: CPT | Performed by: FAMILY MEDICINE

## 2024-11-25 ASSESSMENT — ENCOUNTER SYMPTOMS
BACK PAIN: 1
TINGLING: 0
LEG PAIN: 0

## 2024-11-25 NOTE — PROGRESS NOTES
Patient is here for acute on chronic back pain which was exacerbated at work on September 26. They did not have seated work to accommodate her needs and therefore she took off 2 weeks. She is now back at work with accommodations.  Pain has improved though still present. Asking for PT referral and [place] paperwork for missed work to go to PT appointments. Patient was advised to return to the orthopedist she saw in October who had referred her to PT and request documentation from her. Secure chat was sent to provider as well communicating the same.     Answers submitted by the patient for this visit:  Back Pain Questionnaire (Submitted on 11/25/2024)  Chief Complaint: Back pain  Chronicity: chronic  Onset: more than 1 year ago  Frequency: constantly  Progression since onset: unchanged  Pain location: sacro-iliac, thoracic spine  Pain quality: aching, burning, shooting  Radiates to: right foot, right thigh  Pain - numeric: 7/10  Pain is: worse during the day  Aggravated by: bending, position, sitting, twisting  Stiffness is present: all day  leg pain: No  tingling: No

## 2024-11-25 NOTE — PROGRESS NOTES
Patient had an injury at work and was seen by orthopedic speciality on October 16 was given a referral for physical therapy    She is here since she was asked to come to see the primary care physician to sign off on FMLA form to take off during her work hours to go for regular physical therapy that was prescribed by back orthopedic speciality    She says the pain is better but she would like to go for physical therapy and she does not want to get penalized at her job    No other requests or no other complaints for today    I messaged the physician assistant who saw her on October 16 explaining the situation the patient request and for them to sign the FMLA form    The patient was asked to call the orthopedic speciality to assist with the physical therapy referral request

## 2025-02-20 ENCOUNTER — OFFICE VISIT (OUTPATIENT)
Dept: OBSTETRICS AND GYNECOLOGY | Facility: CLINIC | Age: 35
End: 2025-02-20
Payer: COMMERCIAL

## 2025-02-20 VITALS
BODY MASS INDEX: 23.32 KG/M2 | WEIGHT: 140 LBS | SYSTOLIC BLOOD PRESSURE: 119 MMHG | DIASTOLIC BLOOD PRESSURE: 76 MMHG | HEIGHT: 65 IN

## 2025-02-20 DIAGNOSIS — N87.0 DYSPLASIA OF CERVIX, LOW GRADE (CIN 1): ICD-10-CM

## 2025-02-20 DIAGNOSIS — Z30.432 ENCOUNTER FOR IUD REMOVAL: ICD-10-CM

## 2025-02-20 DIAGNOSIS — Z30.09 BIRTH CONTROL COUNSELING: ICD-10-CM

## 2025-02-20 DIAGNOSIS — R87.612 LGSIL ON PAP SMEAR OF CERVIX: Primary | ICD-10-CM

## 2025-02-20 PROCEDURE — 3008F BODY MASS INDEX DOCD: CPT | Performed by: OBSTETRICS & GYNECOLOGY

## 2025-02-20 PROCEDURE — 58301 REMOVE INTRAUTERINE DEVICE: CPT | Performed by: OBSTETRICS & GYNECOLOGY

## 2025-02-20 PROCEDURE — 99204 OFFICE O/P NEW MOD 45 MIN: CPT | Performed by: OBSTETRICS & GYNECOLOGY

## 2025-02-20 SDOH — ECONOMIC STABILITY: TRANSPORTATION INSECURITY
IN THE PAST 12 MONTHS, HAS THE LACK OF TRANSPORTATION KEPT YOU FROM MEDICAL APPOINTMENTS OR FROM GETTING MEDICATIONS?: NO

## 2025-02-20 SDOH — ECONOMIC STABILITY: INCOME INSECURITY: IN THE LAST 12 MONTHS, WAS THERE A TIME WHEN YOU WERE NOT ABLE TO PAY THE MORTGAGE OR RENT ON TIME?: NO

## 2025-02-20 SDOH — ECONOMIC STABILITY: FOOD INSECURITY: WITHIN THE PAST 12 MONTHS, THE FOOD YOU BOUGHT JUST DIDN'T LAST AND YOU DIDN'T HAVE MONEY TO GET MORE.: NEVER TRUE

## 2025-02-20 SDOH — ECONOMIC STABILITY: TRANSPORTATION INSECURITY
IN THE PAST 12 MONTHS, HAS LACK OF TRANSPORTATION KEPT YOU FROM MEETINGS, WORK, OR FROM GETTING THINGS NEEDED FOR DAILY LIVING?: NO

## 2025-02-20 SDOH — ECONOMIC STABILITY: FOOD INSECURITY: WITHIN THE PAST 12 MONTHS, YOU WORRIED THAT YOUR FOOD WOULD RUN OUT BEFORE YOU GOT MONEY TO BUY MORE.: NEVER TRUE

## 2025-02-20 ASSESSMENT — PATIENT HEALTH QUESTIONNAIRE - PHQ9
SUM OF ALL RESPONSES TO PHQ9 QUESTIONS 1 AND 2: 0
2. FEELING DOWN, DEPRESSED OR HOPELESS: NOT AT ALL
1. LITTLE INTEREST OR PLEASURE IN DOING THINGS: NOT AT ALL

## 2025-02-20 ASSESSMENT — SOCIAL DETERMINANTS OF HEALTH (SDOH)
WITHIN THE LAST YEAR, HAVE YOU BEEN AFRAID OF YOUR PARTNER OR EX-PARTNER?: NO
WITHIN THE LAST YEAR, HAVE TO BEEN RAPED OR FORCED TO HAVE ANY KIND OF SEXUAL ACTIVITY BY YOUR PARTNER OR EX-PARTNER?: NO
HOW HARD IS IT FOR YOU TO PAY FOR THE VERY BASICS LIKE FOOD, HOUSING, MEDICAL CARE, AND HEATING?: NOT HARD AT ALL
WITHIN THE LAST YEAR, HAVE YOU BEEN KICKED, HIT, SLAPPED, OR OTHERWISE PHYSICALLY HURT BY YOUR PARTNER OR EX-PARTNER?: NO
WITHIN THE LAST YEAR, HAVE YOU BEEN HUMILIATED OR EMOTIONALLY ABUSED IN OTHER WAYS BY YOUR PARTNER OR EX-PARTNER?: NO

## 2025-02-20 NOTE — LETTER
February 20, 2025     Patient: Keyona Navas   YOB: 1990   Date of Visit: 2/20/2025       To Whom It May Concern:    Keyona Navas was seen in my clinic on 2/20/2025 at 9:30 am. Please excuse Keyona for her absence from work on this day to make the appointment.    If you have any questions or concerns, please don't hesitate to call.         Sincerely,         Karen Rodrigues MD        CC: No Recipients

## 2025-02-20 NOTE — PROGRESS NOTES
"Subjective   Patient ID: Keyona Navas is a 34 y.o. female who presents for Contraception (Iud removal, would like to take  break on hormones ).    Patient presents today as a new patient to discuss her IUD  Mirena IUD was due to be removed February of last year.  Cycles are irregular.  Has 2 days of spotting with her IUD    Birth control options reviewed with patient.  Patient would like to take a break from hormones.  Condoms encouraged    ROS  All Normal Review of Systems  Constitutional: no fever, no chills, no recent weight gain, no recent weight loss and no fatigue.    Gastrointestinal: no abdominal pain, no constipation, no nausea, no diarrhea and no vomiting.    Genitourinary: no dysuria, no urinary incontinence, no vaginal dryness, no vaginal itching, no dyspareunia, no pelvic pain, no dysmenorrhea, no sexual problems, no change in urinary frequency, no vaginal discharge, no unexplained vaginal bleeding and no lesion/sore.    Breasts: No masses.  No nipple discharge.  No redness    Objective   /76   Ht 1.651 m (5' 5\")   Wt 63.5 kg (140 lb)   LMP 02/20/2025   BMI 23.30 kg/m²    Physical Exam  General: No distress.  Alert and oriented  Abdomen: Soft, nontender, nondistended  External Genitalia:  no masses.  no erythema.  no discharge noted.  Vagina:  no masses.  no discharge noted.    Cervix: no masses.    Uterus:  normal size and contour.  no masses. palpated  Adnexa: R: no masses, non tender.    Adnexa: L: no masses.  non tender  Extremities: No swelling  Psych: No sadness.    Patient ID: Keyona Navas is a 34 y.o. female.    IUD Removal    Performed by: Karen Rodrigues MD  Authorized by: Karen Rodrigues MD    Procedure: IUD removal    Consent obtained: verbal consent obtained.    Other reason for removal:  Past due for removal  Strings visualized: yes    Tenaculum applied to cervix: no    IUD grasped by forceps: yes    Performed with ultrasound guidance: no    IUD removed: yes "    Date/Time of Removal:  2/20/2025 10:04 AM  Removed without complications: yes    IUD intact: yes    Cervix manually dilated: no          Assessment/Plan   1) low-grade MICHAEL Pap with JARAD-1  Pap repeated today.  Paps and dysplasia reviewed with patient.  Will follow closely.  Will follow-up in July with her annual exam    2) Encounter for IUD removal  Verbal consent obtained  IUD removed without any complications.  Patient tolerated procedure well  Condoms encouraged as she would like a hormone free interval      Thank you for your visit to our office today.

## 2025-03-12 ENCOUNTER — HOSPITAL ENCOUNTER (EMERGENCY)
Facility: HOSPITAL | Age: 35
Discharge: HOME | End: 2025-03-12
Payer: COMMERCIAL

## 2025-03-12 VITALS
TEMPERATURE: 96.8 F | BODY MASS INDEX: 22.66 KG/M2 | WEIGHT: 136 LBS | RESPIRATION RATE: 18 BRPM | OXYGEN SATURATION: 99 % | SYSTOLIC BLOOD PRESSURE: 122 MMHG | HEART RATE: 66 BPM | HEIGHT: 65 IN | DIASTOLIC BLOOD PRESSURE: 83 MMHG

## 2025-03-12 DIAGNOSIS — S09.90XA HEAD INJURY, INITIAL ENCOUNTER: Primary | ICD-10-CM

## 2025-03-12 DIAGNOSIS — R51.9 ACUTE NONINTRACTABLE HEADACHE, UNSPECIFIED HEADACHE TYPE: ICD-10-CM

## 2025-03-12 PROCEDURE — 2500000001 HC RX 250 WO HCPCS SELF ADMINISTERED DRUGS (ALT 637 FOR MEDICARE OP)

## 2025-03-12 PROCEDURE — 99282 EMERGENCY DEPT VISIT SF MDM: CPT

## 2025-03-12 RX ORDER — ACETAMINOPHEN 325 MG/1
650 TABLET ORAL ONCE
Status: COMPLETED | OUTPATIENT
Start: 2025-03-12 | End: 2025-03-12

## 2025-03-12 RX ORDER — IBUPROFEN 600 MG/1
600 TABLET ORAL ONCE
Status: COMPLETED | OUTPATIENT
Start: 2025-03-12 | End: 2025-03-12

## 2025-03-12 RX ADMIN — IBUPROFEN 600 MG: 600 TABLET, FILM COATED ORAL at 12:22

## 2025-03-12 RX ADMIN — ACETAMINOPHEN 650 MG: 325 TABLET, FILM COATED ORAL at 12:22

## 2025-03-12 ASSESSMENT — COLUMBIA-SUICIDE SEVERITY RATING SCALE - C-SSRS
2. HAVE YOU ACTUALLY HAD ANY THOUGHTS OF KILLING YOURSELF?: NO
6. HAVE YOU EVER DONE ANYTHING, STARTED TO DO ANYTHING, OR PREPARED TO DO ANYTHING TO END YOUR LIFE?: NO
1. IN THE PAST MONTH, HAVE YOU WISHED YOU WERE DEAD OR WISHED YOU COULD GO TO SLEEP AND NOT WAKE UP?: NO

## 2025-03-12 ASSESSMENT — PAIN SCALES - GENERAL
PAINLEVEL_OUTOF10: 7
PAINLEVEL_OUTOF10: 5 - MODERATE PAIN

## 2025-03-12 ASSESSMENT — PAIN - FUNCTIONAL ASSESSMENT: PAIN_FUNCTIONAL_ASSESSMENT: 0-10

## 2025-03-12 ASSESSMENT — PAIN DESCRIPTION - LOCATION: LOCATION: HEAD

## 2025-03-12 NOTE — Clinical Note
Keyona Navas was seen and treated in our emergency department on 3/12/2025.  She may return to work on 03/17/2025.       If you have any questions or concerns, please don't hesitate to call.      Jv Desouza PA-C

## 2025-03-12 NOTE — ED PROVIDER NOTES
HPI   Chief Complaint   Patient presents with    Head Injury     Patient is a 34-year-old female with benign PMH presenting to the ED with concern for headache and head injury.  Patient states on Monday she was walking through a door frame when she accidentally hit the left side of her forehead on a door frame.  Patient states immediately after she had pain over the area she hit but headache resolved later that day.  Starting yesterday she developed a headache again.  Headache is located on the front left side of her head radiates to the back.  Rated 5/10, nothing makes it better or worse.  Patient was at work today when she had a runny nose.  Patient wiped a little bit of it away and has not had any problems since.  She denies any loss of conscious, vomiting, amnesia, confusion, seizures, numbness, weakness, tingling, dizziness, change in vision or speech.  She does not take any blood thinners.  She denies pain anywhere else.  Patient had a tooth removed on the upper left side of her jaw last week.  She called her dentist who states she may have dry socket.  She is seeing her dentist later today.        Patient History   Past Medical History:   Diagnosis Date    Nausea 08/11/2020    Nausea in adult    Nontraumatic compartment syndrome of left lower extremity 01/05/2016    Compartment syndrome of left lower extremity due to exertion    Nontraumatic compartment syndrome of right lower extremity 01/05/2016    Compartment syndrome of right lower extremity due to exertion    Nontraumatic compartment syndrome of unspecified lower extremity 09/29/2015    Exertional compartment syndrome of lower extremity    Other chest pain 04/28/2017    Atypical chest pain    Other conditions influencing health status 12/22/2015    Exertional compartment syndrome    Other specified disorders of bone, lower leg 12/22/2015    Bilateral tibial pain    Other specified health status     No pertinent past medical history    Pain in right knee  04/23/2015    Bilateral knee pain    Pain in right leg 12/22/2015    Leg pain, bilateral     Past Surgical History:   Procedure Laterality Date    LEG SURGERY Bilateral     release for compartment syndrome     Family History   Problem Relation Name Age of Onset    Diabetes Mother      Diabetes Other      Hypertension Other       Social History     Tobacco Use    Smoking status: Every Day     Types: Pipe    Smokeless tobacco: Current    Tobacco comments:     Patient has referral to smoking cessation; unable to make an appt. Please advise patient next step to succeed with smoking cessation.   Vaping Use    Vaping status: Every Day    Substances: Nicotine, THC   Substance Use Topics    Alcohol use: Yes    Drug use: Yes     Types: Marijuana     Comment: Occasionally       Physical Exam   ED Triage Vitals [03/12/25 1120]   Temperature Heart Rate Respirations BP   36 °C (96.8 °F) 70 16 122/83      Pulse Ox Temp src Heart Rate Source Patient Position   100 % -- -- --      BP Location FiO2 (%)     -- --       Physical Exam  Constitutional:       General: She is not in acute distress.     Appearance: Normal appearance. She is not ill-appearing, toxic-appearing or diaphoretic.   HENT:      Head: Normocephalic and atraumatic.      Comments: Scalp is nontender to palpation.  No scalp crepitus, deformity, hematoma, swelling, erythema, bruising.  No raccoon eyes or Irvin sign.     Right Ear: Tympanic membrane, ear canal and external ear normal. There is no impacted cerumen.      Left Ear: Tympanic membrane, ear canal and external ear normal. There is no impacted cerumen.      Ears:      Comments: No hemotympanum bilaterally     Nose: Nose normal. No congestion or rhinorrhea.      Mouth/Throat:      Mouth: Mucous membranes are moist.      Pharynx: Oropharynx is clear. No oropharyngeal exudate or posterior oropharyngeal erythema.   Eyes:      General: No visual field deficit or scleral icterus.        Right eye: No discharge.          Left eye: No discharge.      Extraocular Movements: Extraocular movements intact.      Conjunctiva/sclera: Conjunctivae normal.      Pupils: Pupils are equal, round, and reactive to light.   Cardiovascular:      Rate and Rhythm: Normal rate and regular rhythm.      Heart sounds: Normal heart sounds. No murmur heard.     No friction rub. No gallop.   Pulmonary:      Effort: Pulmonary effort is normal. No respiratory distress.      Breath sounds: Normal breath sounds. No stridor. No wheezing, rhonchi or rales.   Abdominal:      General: Abdomen is flat. Bowel sounds are normal. There is no distension.      Palpations: Abdomen is soft.      Tenderness: There is no abdominal tenderness.   Musculoskeletal:         General: Normal range of motion.      Cervical back: Normal range of motion and neck supple. No rigidity or tenderness.   Lymphadenopathy:      Cervical: No cervical adenopathy.   Skin:     General: Skin is warm and dry.      Capillary Refill: Capillary refill takes less than 2 seconds.   Neurological:      General: No focal deficit present.      Mental Status: She is alert and oriented to person, place, and time.      Cranial Nerves: Cranial nerves 2-12 are intact. No cranial nerve deficit, dysarthria or facial asymmetry.      Sensory: No sensory deficit.      Motor: No weakness, tremor, atrophy or abnormal muscle tone.      Coordination: Coordination is intact. Romberg sign negative. Coordination normal. Finger-Nose-Finger Test and Heel to Shin Test normal. Rapid alternating movements normal.      Gait: Gait is intact. Gait and tandem walk normal.   Psychiatric:         Mood and Affect: Mood normal.         Behavior: Behavior normal.           ED Course & MDM   ED Course as of 03/12/25 1224   Wed Mar 12, 2025   1222 Patient is a 34-year-old female presenting to the ED with concern for headache and head injury.  Vital signs are stable, patient is nontoxic-appearing.  On exam there are no neurodeficits.   There is no raccoon eyes or Irvin sign or other sign of basilar skull fracture.  According to [facility] head CT rules patient is very low risk for head injury recommends avoiding head imaging at this time.  I suspect patient's symptoms may be from a possible concussion.  Discussed with patient and she is comfortable not obtaining imaging at this time.  Patient states that she is going to see her dentist for possible dry socket after this.  Recommend use of Tylenol ibuprofen as needed for headache.  Recommend cognitive rest.  Referral for neurology concussion clinic placed.  Recommend follow-up as needed if symptoms persist.  Patient was told to return to ED for any new or worsening symptoms. [VS]      ED Course User Index  [VS] Jv Desouza PA-C         Diagnoses as of 03/12/25 1224   Head injury, initial encounter   Acute nonintractable headache, unspecified headache type                 No data recorded                           Medical Decision Making  Chronic Medical Conditions Significantly Affecting Care:      Escalation of Care: Appropriate for outpatient management    Counseling: Spoke with the patient and discussed today´s findings, in addition to providing specific details for the plan of care and expected course.  Patient was given the opportunity to ask questions.    Discussed return precautions and importance of follow-up.  Advised to follow-up with PCP and neurology.  Advised to return to the ED for changing or worsening symptoms, new symptoms, complaint specific precautions, and precautions listed on the discharge paperwork.  Educated on the common potential side effects of medications prescribed.    I advised the patient that the emergency evaluation and treatment provided today doesn't end their need for medical care. It is very important that they follow-up with their primary care provider or other specialist as instructed.    The plan of care was mutually agreed upon with the patient.  The patient and/or family were given the opportunity to ask questions. All questions asked today in the ED were answered to the best of my ability with today's information.    I specifically advised the patient to return to the ED for changing or worsening symptoms, worrisome new symptoms, or for any complaint specific precautions listed on the discharge paperwork.    Procedure  Procedures     Jv Desouza PA-C  03/12/25 1228

## 2025-03-12 NOTE — DISCHARGE INSTRUCTIONS
Can take tylenol and motrin as needed for pain. You may develop a concussion.  This can sometimes have a lasting side effect such as prolonged headaches or photophobia.  We will place an order for neurology follow-up for concussion clinic follow-up if symptoms persist.    Recommended monitoring for signs and symptoms of worsening head injury such as increased headache, vision changes, confusion, nausea, or vomiting

## 2025-03-12 NOTE — ED TRIAGE NOTES
Hit left side of her head while walking through door frame x2 days ago and has missed 2+ days of work now she has a runny nose

## 2025-07-10 ENCOUNTER — APPOINTMENT (OUTPATIENT)
Dept: OBSTETRICS AND GYNECOLOGY | Facility: CLINIC | Age: 35
End: 2025-07-10
Payer: COMMERCIAL

## 2025-07-10 VITALS
BODY MASS INDEX: 27.66 KG/M2 | DIASTOLIC BLOOD PRESSURE: 70 MMHG | SYSTOLIC BLOOD PRESSURE: 126 MMHG | WEIGHT: 166 LBS | HEIGHT: 65 IN

## 2025-07-10 DIAGNOSIS — R87.612 LGSIL ON PAP SMEAR OF CERVIX: ICD-10-CM

## 2025-07-10 DIAGNOSIS — Z01.419 WELL WOMAN EXAM WITH ROUTINE GYNECOLOGICAL EXAM: Primary | ICD-10-CM

## 2025-07-10 PROCEDURE — 99395 PREV VISIT EST AGE 18-39: CPT | Performed by: OBSTETRICS & GYNECOLOGY

## 2025-07-10 PROCEDURE — 3008F BODY MASS INDEX DOCD: CPT | Performed by: OBSTETRICS & GYNECOLOGY

## 2025-07-10 NOTE — PROGRESS NOTES
"Keyona Navas is a 35 y.o. female who is here for a annual exam.     Periods are regular every 28-30 days, lasting 5  days.     Sexually active: Active no concerns.  Using condoms    Regular self breast exam: yes  no concerns on her exams    Menstrual History:  OB History          0    Para   0    Term   0       0    AB   0    Living   0         SAB   0    IAB   0    Ectopic   0    Multiple   0    Live Births   0                Patient's last menstrual period was 2025.         Review of Systems  All Normal Review of Systems  Constitutional: no fever, no chills, no recent weight gain, no recent weight loss and no fatigue.    Gastrointestinal: no abdominal pain, no constipation, no nausea, no diarrhea and no vomiting.    Genitourinary: no dysuria, no urinary incontinence, no vaginal dryness, no vaginal itching, no dyspareunia, no pelvic pain, no dysmenorrhea, no sexual problems, no change in urinary frequency, no vaginal discharge, no unexplained vaginal bleeding and no lesion/sore.    Breasts: No masses.  No nipple discharge.  No redness    Objective   /70   Ht 1.651 m (5' 5\")   Wt 75.3 kg (166 lb)   LMP 2025   BMI 27.62 kg/m²     OBGyn Exam   Physical Exam  Constitutional: Alert and in no acute distress. Well developed, well nourished.   Head and Face: Head and face: Normal.    Eyes: Normal external exam - nonicteric sclera, extraocular movements intact (EOMI) and no ptosis.   Ears, Nose, Mouth, and Throat: External inspection of ears and nose: Normal.    Neck: No neck asymmetry. Supple. Thyroid not enlarged and there were no palpable thyroid nodules.   Chest: Breasts: Normal appearance, no nipple discharge and no skin changes. Palpation of breasts and axillae: No palpable mass and no axillary lymphadenopathy.   Abdomen: Soft nontender; no abdominal mass palpated. No organomegaly. No hernias.     Genitourinary:   External genitalia: Normal. No inguinal lymphadenopathy. " Bartholin's Urethral and Skenes Glands: Normal. Urethra: Normal.    Bladder: Normal on palpation.   Vagina: Normal. No discharge  Cervix: Normal.    Uterus: Normal.    Right Adnexa/parametria: Normal.  Left Adnexa/parametria: Normal.    Inspection of Perianal Area: Normal.     Musculoskeletal: No joint swelling seen, normal movements of all extremities.   Skin: Normal skin color and pigmentation, normal skin turgor, and no rash.   Neurologic: Non-focal. Grossly intact.   Psychiatric: Alert and oriented x 3. Affect normal to patient baseline. Mood: Appropriate.      Assessment/Plan   1) Annual exam:  Normal exam today  Pap with HPV testing completed    Thank you again for your visit to our office today  Please follow-up as needed or in 1 year with your annual exam

## 2025-07-28 LAB
CYTOLOGY CMNT CVX/VAG CYTO-IMP: NORMAL
HPV HR 12 DNA GENITAL QL NAA+PROBE: NEGATIVE
HPV HR GENOTYPES PNL CVX NAA+PROBE: NEGATIVE
HPV16 DNA SPEC QL NAA+PROBE: NEGATIVE
HPV18 DNA SPEC QL NAA+PROBE: NEGATIVE
LAB AP HPV GENOTYPE QUESTION: YES
LAB AP HPV HR: NORMAL
LABORATORY COMMENT REPORT: NORMAL
PATH REPORT.TOTAL CANCER: NORMAL

## 2025-09-17 ENCOUNTER — APPOINTMENT (OUTPATIENT)
Dept: PRIMARY CARE | Facility: CLINIC | Age: 35
End: 2025-09-17
Payer: COMMERCIAL

## 2026-07-13 ENCOUNTER — APPOINTMENT (OUTPATIENT)
Dept: OBSTETRICS AND GYNECOLOGY | Facility: CLINIC | Age: 36
End: 2026-07-13
Payer: COMMERCIAL

## (undated) DEVICE — TOWEL, SURGICAL, NEURO, O/R, 16 X 26, BLUE, STERILE

## (undated) DEVICE — SUTURE, SILK, 2-0, 30 IN, SH, BLACK

## (undated) DEVICE — NEEDLE, HYPODERMIC, MONOJECT, TRI-BEVELED, ANTI-CORING, 25 G X 1.25 IN, LUER LOCK HUB, RED

## (undated) DEVICE — DRAPE, SMARTDRAPE, FOR TIVATO MICROSCOPE

## (undated) DEVICE — SUTURE, MONOCRYL, 4-0, 18 IN, PS2, UNDYED

## (undated) DEVICE — SYRINGE, 1 CC, LUER LOCK

## (undated) DEVICE — Device

## (undated) DEVICE — TUBING, SUCTION, OTOMED

## (undated) DEVICE — GLOVE, SURGICAL, PROTEXIS PI , 7.0, PF, LF

## (undated) DEVICE — BAND, RUBBER, 3 IN, STERILE

## (undated) DEVICE — PROTECTOR, NERVE, ULNAR, PINK

## (undated) DEVICE — DRAPE, SURGICAL, OTOLOGY GLASSCOCK

## (undated) DEVICE — TUBING, SUCTION, 9 FT, STERILE, CLEAR

## (undated) DEVICE — CATHETER, IV, ANGIOCATH, 20 G X 1.88 IN, FEP POLYMER

## (undated) DEVICE — WAX, BONE, 2.5 GM

## (undated) DEVICE — CLEANER, WIPE, INSTRUMENT, 3.25 X 3.25 IN

## (undated) DEVICE — DRESSING, MOISTURE VAPOR PERMEABLE, POLYSKIN II, 2 X 2.75 IN, TRANSPARENT

## (undated) DEVICE — SUTURE, VICRYL, 3-0,18 IN, SH, UNDYED

## (undated) DEVICE — STRIP, SKIN CLOSURE, STERI STRIP, REINFORCED, 0.5 X 4 IN

## (undated) DEVICE — DRAPE, INSTRUMENT, W/POUCH, STERI DRAPE, 9 5/8 X 18 LONG

## (undated) DEVICE — STOCKINETTE, IMPERVIOUS, 12 X 48 IN, LF, STERILE

## (undated) DEVICE — TAPE, SILK, DURAPORE, 3 IN X 10 YD, LF

## (undated) DEVICE — SYRINGE, MONOJECT, LUER LOCK, 3 CC, LF

## (undated) DEVICE — NEEDLE, HYPODERMIC, MONOJECT, TRI-BEVELED, ANTI-CORING, 27 G X 1.25 IN, LUER LOCK HUB, YELLOW

## (undated) DEVICE — COMB, HAIR, 7 IN, PLASTIC, BLACK